# Patient Record
Sex: FEMALE | Race: WHITE
[De-identification: names, ages, dates, MRNs, and addresses within clinical notes are randomized per-mention and may not be internally consistent; named-entity substitution may affect disease eponyms.]

---

## 2017-04-27 ENCOUNTER — HOSPITAL ENCOUNTER (OUTPATIENT)
Dept: HOSPITAL 31 - C.ENDO | Age: 67
Discharge: HOME | End: 2017-04-27
Attending: INTERNAL MEDICINE
Payer: MEDICARE

## 2017-04-27 VITALS — RESPIRATION RATE: 14 BRPM

## 2017-04-27 VITALS — DIASTOLIC BLOOD PRESSURE: 54 MMHG | SYSTOLIC BLOOD PRESSURE: 124 MMHG | HEART RATE: 59 BPM

## 2017-04-27 VITALS — BODY MASS INDEX: 32.8 KG/M2

## 2017-04-27 VITALS — OXYGEN SATURATION: 100 %

## 2017-04-27 VITALS — TEMPERATURE: 98.2 F

## 2017-04-27 DIAGNOSIS — K29.50: ICD-10-CM

## 2017-04-27 DIAGNOSIS — B96.81: ICD-10-CM

## 2017-04-27 DIAGNOSIS — Z12.11: ICD-10-CM

## 2017-04-27 DIAGNOSIS — D12.2: ICD-10-CM

## 2017-04-27 DIAGNOSIS — K21.0: Primary | ICD-10-CM

## 2017-04-27 DIAGNOSIS — K64.1: ICD-10-CM

## 2017-04-27 PROCEDURE — 88312 SPECIAL STAINS GROUP 1: CPT

## 2017-04-27 PROCEDURE — 45380 COLONOSCOPY AND BIOPSY: CPT

## 2017-04-27 PROCEDURE — 88305 TISSUE EXAM BY PATHOLOGIST: CPT

## 2017-04-27 PROCEDURE — 88342 IMHCHEM/IMCYTCHM 1ST ANTB: CPT

## 2017-04-27 PROCEDURE — 43239 EGD BIOPSY SINGLE/MULTIPLE: CPT

## 2017-12-23 ENCOUNTER — HOSPITAL ENCOUNTER (OUTPATIENT)
Dept: HOSPITAL 14 - H.ER | Age: 67
Setting detail: OBSERVATION
LOS: 1 days | Discharge: HOME | End: 2017-12-24
Payer: MEDICARE

## 2017-12-23 VITALS — BODY MASS INDEX: 32.8 KG/M2

## 2017-12-23 DIAGNOSIS — M81.0: ICD-10-CM

## 2017-12-23 DIAGNOSIS — E78.5: ICD-10-CM

## 2017-12-23 DIAGNOSIS — R07.89: Primary | ICD-10-CM

## 2017-12-23 DIAGNOSIS — I10: ICD-10-CM

## 2017-12-23 DIAGNOSIS — J45.909: ICD-10-CM

## 2017-12-23 DIAGNOSIS — Z79.899: ICD-10-CM

## 2017-12-23 LAB
APTT BLD: 33.2 SECONDS (ref 25.6–37.1)
BASOPHILS # BLD AUTO: 0.1 K/UL (ref 0–0.2)
BASOPHILS NFR BLD: 0.5 % (ref 0–2)
BUN SERPL-MCNC: 21 MG/DL (ref 7–17)
CALCIUM SERPL-MCNC: 9.5 MG/DL (ref 8.4–10.2)
CHLORIDE SERPL-SCNC: 105 MMOL/L (ref 98–107)
CO2 SERPL-SCNC: 29 MMOL/L (ref 22–30)
EOSINOPHIL # BLD AUTO: 0.4 K/UL (ref 0–0.7)
EOSINOPHIL NFR BLD: 3.9 % (ref 0–4)
ERYTHROCYTE [DISTWIDTH] IN BLOOD BY AUTOMATED COUNT: 14.1 % (ref 11.5–14.5)
GLUCOSE SERPL-MCNC: 102 MG/DL (ref 65–105)
HCT VFR BLD CALC: 36.1 % (ref 34–47)
LYMPHOCYTES # BLD AUTO: 2.3 K/UL (ref 1–4.3)
LYMPHOCYTES NFR BLD AUTO: 20 % (ref 20–40)
MCH RBC QN AUTO: 28.6 PG (ref 27–31)
MCHC RBC AUTO-ENTMCNC: 33 G/DL (ref 33–37)
MCV RBC AUTO: 86.5 FL (ref 81–99)
MONOCYTES # BLD: 1 K/UL (ref 0–0.8)
MONOCYTES NFR BLD: 9 % (ref 0–10)
NEUTROPHILS # BLD: 7.7 K/UL (ref 1.8–7)
NEUTROPHILS NFR BLD AUTO: 66.6 % (ref 50–75)
NRBC BLD AUTO-RTO: 0.1 % (ref 0–0)
PLATELET # BLD: 253 K/UL (ref 130–400)
PMV BLD AUTO: 8.5 FL (ref 7.2–11.7)
POTASSIUM SERPL-SCNC: 3.5 MMOL/L (ref 3.6–5)
SODIUM SERPL-SCNC: 141 MMOL/L (ref 132–148)
WBC # BLD AUTO: 11.6 K/UL (ref 4.8–10.8)

## 2017-12-23 PROCEDURE — 93005 ELECTROCARDIOGRAM TRACING: CPT

## 2017-12-23 PROCEDURE — 85027 COMPLETE CBC AUTOMATED: CPT

## 2017-12-23 PROCEDURE — 36415 COLL VENOUS BLD VENIPUNCTURE: CPT

## 2017-12-23 PROCEDURE — 80053 COMPREHEN METABOLIC PANEL: CPT

## 2017-12-23 PROCEDURE — 80048 BASIC METABOLIC PNL TOTAL CA: CPT

## 2017-12-23 PROCEDURE — 84443 ASSAY THYROID STIM HORMONE: CPT

## 2017-12-23 PROCEDURE — 85730 THROMBOPLASTIN TIME PARTIAL: CPT

## 2017-12-23 PROCEDURE — 84436 ASSAY OF TOTAL THYROXINE: CPT

## 2017-12-23 PROCEDURE — 85610 PROTHROMBIN TIME: CPT

## 2017-12-23 PROCEDURE — 99285 EMERGENCY DEPT VISIT HI MDM: CPT

## 2017-12-23 PROCEDURE — 71010: CPT

## 2017-12-23 PROCEDURE — 85025 COMPLETE CBC W/AUTO DIFF WBC: CPT

## 2017-12-23 PROCEDURE — 84484 ASSAY OF TROPONIN QUANT: CPT

## 2017-12-23 NOTE — ED PDOC
HPI: Chest Pain


Chief Complaint (Nursing): Chest Pain


Chief Complaint (Provider): Chest Pain


History Per: Patient


History/Exam Limitations: language barrier ( [id number])


Onset/Duration Of Symptoms: Days (2)


Current Symptoms Are (Timing): Still Present


Severity: Moderate


Pain Scale Rating Of: 7


Front/Back of Body, Lg (Color): 


  __________________________














  __________________________





 1 - Chest pain





Quality: Sharp


Exacerbating Factors: Deep Breathing


Alleviating Factors: Rest


Additional History Per: Family


Additional Complaint(s): 





Daria Jiménez is a pleasant 68 yo lady with PMHx of anxiety, dyslipidemia, HTN, 

asthma presents with a 2 day history of chest pain. Pain is R annelise-sternal. 

Consistent for the last 2 days. Sharp in nature. Localized. Associated with 

SOB. Rated 7/10. No history of MI or stroke. Aggravated with movement and 

alleviated slightly with rest  Daughter was present.


She shares that today, she was cutting a cucumber and accidentally punctured 

her middle finger. There was slight bleeding. However, she still experiences 

pain with some limitation in extension and flexion.





PCP: Dr. Faheem Villela


FamHx: No hx of CAD or DM


SurgHx: none


Soc: Denies smoking, alcohol, illicit drugs


NKDA


 





- Risk Factors


TAD Risk Factors: Pos: Hypertension





Past Medical History


Vital Signs: 


 Last Vital Signs











Temp      


 


Pulse  80   12/23/17 19:25


 


Resp  20   12/23/17 19:25


 


BP  141/73   12/23/17 19:25


 


Pulse Ox  99   12/23/17 20:44














- Medical History


PMH: Anxiety, Arthritis, Asthma, HTN, Hyperlipidemia, Osteoporosis


   Denies: Chronic Kidney Disease





- Family History


Family History: States: No Known Family Hx





- Living Arrangements


Living Arrangements: With Family





- Immunization History


Hx Tetanus Toxoid Vaccination: No


Hx Influenza Vaccination: No


Hx Pneumococcal Vaccination: No





- Home Medications


Home Medications: 


 Ambulatory Orders











 Medication  Instructions  Recorded


 


Amlodipine Besylate 2.5 mg PO DAILY 09/25/15


 


Triamterene/Hydrochlorothiazid 1 cap PO DAILY 09/25/15





[Triamterene-Hydrochlorothiazide  





25 mg-37.5 mg]  


 


Albuterol HFA [Ventolin HFA 90 1 puff IH BID PRN 04/27/17





mcg/actuation (8 g)]  


 


Alendronate Sodium [Binosto] 70 mg PO QWK 04/27/17


 


Budesonide/Formoterol Fumarate 1 aer IH BID 04/27/17





[Symbicort]  


 


Calcium Carbonate [Caltrate 600] 600 mg PO DAILY 04/27/17


 


Diclofenac Sodium [Voltaren] 100 gm TP BID PRN 04/27/17


 


Lactulose 10 gm PO DAILY PRN 04/27/17


 


Multivit,Iron,Min 5/Folic Acid 1 tab PO DAILY 04/27/17





[Strovite Forte]  


 


Naproxen [Naprosyn] 500 mg PO DAILY PRN 04/27/17


 


Omega-3 Fatty Acids/Fish Oil [Fish 1,000 mg PO BID 04/27/17





Oil 1,000 mg Capsule]  


 


Omeprazole 40 mg PO DAILY 04/27/17














- Allergies


Allergies/Adverse Reactions: 


 Allergies











Allergy/AdvReac Type Severity Reaction Status Date / Time


 


No Known Allergies Allergy   Verified 09/25/15 07:39














DANIEL Risk Score for UA/NSTEMI





- DANIEL Risk Score


Age > 64: YES


3 or more CAD Risk Factors: NO


Known CAD (Stenosis greater than 50%): NO


Aspirin use in past 7 days: NO


Severe Angina: YES


EKG ST changes greater than 0.5mm: NO


Positive Cardiac Marker: NO


DANIEL Score: 2


Risk %: 8%





Curb-65 Severity Score





- CURB-65 Severity Score


Respiratory Rate greater than/equal to 30: No


Systolic BP <90 or Diastolic BP less than/equal 60mmHg: No


Age >64: Yes


Curb-65 Score: 1


Percentage 30-day mortality: 2.7%





Review of Systems


Cardiovascular: Positive for: Chest Pain (R sternal)


Respiratory: Positive for: Shortness of Breath


Neurological: Negative for: Altered Mental Status


Psych: Positive for: Anxiety





Physical Exam





- Reviewed


Vital Signs Reviewed: Yes





- Physical Exam


Appears: Positive for: Uncomfortable


Skin: Positive for: Warm, Dry


Eye Exam: Positive for: Normal appearance, EOMI.  Negative for: Nystagmus


Cardiovascular/Chest: Positive for: Regular Rate, Rhythm, Other (R sternal 

tenderness upon palpation)


Respiratory: Positive for: Normal Breath Sounds


Gastrointestinal/Abdominal: Positive for: Normal Exam, Bowel Sounds, Soft


Extremity: Positive for: Capillary Refill (<2 sec), Swelling (L middle digit), 

Other (L middle digit with healing puncture wound. No surrounding erythema. 

limited ROM 2/2 pain. No ulcerations or purulent discharge.)


Neurologic/Psych: Positive for: Alert, Oriented





- Laboratory Results


Result Diagrams: 


 12/23/17 19:57





 12/23/17 19:57





- ECG


O2 Sat by Pulse Oximetry: 99





- Progress


ED Course And Treament: 





Troponin negative x1. Observation overnight for serial troponin. 





Disposition





- Clinical Impression


Clinical Impression: 


 Chest pain








- Patient ED Disposition


Is Patient to be Admitted: Yes





- Disposition


Disposition Time: 20:45


Condition: FAIR


Forms:  Location (English)

## 2017-12-24 VITALS
HEART RATE: 65 BPM | OXYGEN SATURATION: 96 % | TEMPERATURE: 98.2 F | DIASTOLIC BLOOD PRESSURE: 68 MMHG | SYSTOLIC BLOOD PRESSURE: 119 MMHG

## 2017-12-24 VITALS — RESPIRATION RATE: 18 BRPM

## 2017-12-24 LAB
ALBUMIN/GLOB SERPL: 1.1 {RATIO} (ref 1–2.1)
ALP SERPL-CCNC: 83 U/L (ref 38–126)
ALT SERPL-CCNC: 25 U/L (ref 9–52)
APTT BLD: 33 SECONDS (ref 25.6–37.1)
AST SERPL-CCNC: 23 U/L (ref 14–36)
BILIRUB SERPL-MCNC: 0.4 MG/DL (ref 0.2–1.3)
BUN SERPL-MCNC: 15 MG/DL (ref 7–17)
CALCIUM SERPL-MCNC: 9.1 MG/DL (ref 8.4–10.2)
CHLORIDE SERPL-SCNC: 107 MMOL/L (ref 98–107)
CO2 SERPL-SCNC: 27 MMOL/L (ref 22–30)
ERYTHROCYTE [DISTWIDTH] IN BLOOD BY AUTOMATED COUNT: 14 % (ref 11.5–14.5)
GLOBULIN SER-MCNC: 3.1 GM/DL (ref 2.2–3.9)
GLUCOSE SERPL-MCNC: 76 MG/DL (ref 65–105)
HCT VFR BLD CALC: 34.5 % (ref 34–47)
MCH RBC QN AUTO: 28.2 PG (ref 27–31)
MCHC RBC AUTO-ENTMCNC: 32.4 G/DL (ref 33–37)
MCV RBC AUTO: 87.3 FL (ref 81–99)
PLATELET # BLD: 221 K/UL (ref 130–400)
POTASSIUM SERPL-SCNC: 3.7 MMOL/L (ref 3.6–5)
PROT SERPL-MCNC: 6.5 G/DL (ref 6.3–8.2)
SODIUM SERPL-SCNC: 142 MMOL/L (ref 132–148)
T4 SERPL-MCNC: 6.9 UG/DL (ref 5.5–11)
TSH SERPL-ACNC: 2.08 MIU/ML (ref 0.46–4.68)
WBC # BLD AUTO: 8.4 K/UL (ref 4.8–10.8)

## 2017-12-24 NOTE — RAD
PROCEDURE:  CHEST RADIOGRAPH, 1 VIEW



HISTORY:

chest pain



COMPARISON:

None available.



FINDINGS:



LUNGS:

Clear.



PLEURA:

No pneumothorax or pleural fluid seen.



CARDIOVASCULAR:

Normal.



OSSEOUS STRUCTURES:

Degenerative changes.



VISUALIZED UPPER ABDOMEN:

Normal.



OTHER FINDINGS:

None. 



IMPRESSION:

No active disease.

## 2017-12-24 NOTE — CARD
--------------- APPROVED REPORT --------------





EKG Measurement

Heart [id number]

[id number]

[id number]

[id number]

[id number]



<Conclusion>

Normal sinus rhythm

Moderate voltage criteria for LVH, may be normal variant

Inferior infarct, age undetermined

Abnormal ECG

## 2017-12-24 NOTE — CP.PCM.DIS
Provider





- Provider


Date of Admission: 


12/23/17 21:41





Attending physician: 


Nancy Freed MD





Time Spent in preparation of Discharge (in minutes): 30





Diagnosis





- Discharge Diagnosis


(1) Chest pain


Status: Acute   





Hospital Course





- Lab Results


Lab Results: 


 Most Recent Lab Values











WBC  11.6 K/uL (4.8-10.8)  H  12/23/17  19:57    


 


RBC  4.17 Mil/uL (3.80-5.20)   12/23/17  19:57    


 


Hgb  11.9 g/dL (12.0-16.0)  L  12/23/17  19:57    


 


Hct  36.1 % (34.0-47.0)   12/23/17  19:57    


 


MCV  86.5 fl (81.0-99.0)   12/23/17  19:57    


 


MCH  28.6 pg (27.0-31.0)   12/23/17  19:57    


 


MCHC  33.0 g/dL (33.0-37.0)   12/23/17  19:57    


 


RDW  14.1 % (11.5-14.5)   12/23/17  19:57    


 


Plt Count  253 K/uL (130-400)   12/23/17  19:57    


 


MPV  8.5 fl (7.2-11.7)   12/23/17  19:57    


 


Neut % (Auto)  66.6 % (50.0-75.0)   12/23/17  19:57    


 


Lymph % (Auto)  20.0 % (20.0-40.0)   12/23/17  19:57    


 


Mono % (Auto)  9.0 % (0.0-10.0)   12/23/17  19:57    


 


Eos % (Auto)  3.9 % (0.0-4.0)   12/23/17  19:57    


 


Baso % (Auto)  0.5 % (0.0-2.0)   12/23/17  19:57    


 


Neut #  7.7 K/uL (1.8-7.0)  H  12/23/17  19:57    


 


Lymph #  2.3 K/uL (1.0-4.3)   12/23/17  19:57    


 


Mono #  1.0 K/uL (0.0-0.8)  H  12/23/17  19:57    


 


Eos #  0.4 K/uL (0.0-0.7)   12/23/17  19:57    


 


Baso #  0.1 K/uL (0.0-0.2)   12/23/17  19:57    


 


PT  10.4 Seconds (9.8-13.1)   12/23/17  19:57    


 


INR  0.9  (0.9-1.2)   12/23/17  19:57    


 


APTT  33.2 Seconds (25.6-37.1)   12/23/17  19:57    


 


Sodium  141 mmol/l (132-148)   12/23/17  19:57    


 


Potassium  3.5 MMOL/L (3.6-5.0)  L  12/23/17  19:57    


 


Chloride  105 mmol/L ()   12/23/17  19:57    


 


Carbon Dioxide  29 mmol/L (22-30)   12/23/17  19:57    


 


Anion Gap  11  (10-20)   12/23/17  19:57    


 


BUN  21 mg/dl (7-17)  H  12/23/17  19:57    


 


Creatinine  0.7 mg/dl (0.7-1.2)   12/23/17  19:57    


 


Est GFR ( Amer)  > 60   12/23/17  19:57    


 


Est GFR (Non-Af Amer)  > 60   12/23/17  19:57    


 


Random Glucose  102 mg/dL ()   12/23/17  19:57    


 


Calcium  9.5 mg/dL (8.4-10.2)   12/23/17  19:57    


 


Troponin I  < 0.0120 ng/mL (0.00-0.120)   12/24/17  06:16    














- Hospital Course


Hospital Course: 





67 YEAR OLD FEMALE WITH PMH HTN WAS ADMITTED FOR CHEST PAIN. CARDIAC ENZYMES 

WERE TRENDED, NEGATIVE X2.  EKG NO ACUTE ISCHEMIA OR INFARCT. CARDIOLOGY WAS 

CONSULTED THIS MORNING. THIS MORNING PATIENT STATED SHE NO LONGER WISHES TO 

STAY. RISKS OF LEAVING AMA, AND BENEFITS OF STAYING EXPLAINED. PATIENT DECIDED 

TO AMA. ABX GIVEN FOR FINGER.





Discharge Exam





- Head Exam


Additional comments: 





DECLINED EXAM





Discharge Plan





- Follow Up Plan


Condition: FAIR


Disposition: AGAINST MEDICAL ADVICE


Additional Instructions: 


return to ER if condition returns or worsens.


follow up with PCP and CARDIOLOGY ASAP

## 2018-07-04 ENCOUNTER — HOSPITAL ENCOUNTER (EMERGENCY)
Dept: HOSPITAL 31 - C.ER | Age: 68
Discharge: HOME | End: 2018-07-04
Payer: MEDICARE

## 2018-07-04 VITALS
HEART RATE: 68 BPM | DIASTOLIC BLOOD PRESSURE: 69 MMHG | RESPIRATION RATE: 16 BRPM | SYSTOLIC BLOOD PRESSURE: 138 MMHG | TEMPERATURE: 98.1 F

## 2018-07-04 VITALS — BODY MASS INDEX: 32.8 KG/M2

## 2018-07-04 VITALS — OXYGEN SATURATION: 100 %

## 2018-07-04 DIAGNOSIS — I10: ICD-10-CM

## 2018-07-04 DIAGNOSIS — E78.00: ICD-10-CM

## 2018-07-04 DIAGNOSIS — R42: Primary | ICD-10-CM

## 2018-07-04 DIAGNOSIS — M81.0: ICD-10-CM

## 2018-07-04 DIAGNOSIS — J45.909: ICD-10-CM

## 2018-07-04 LAB
ALBUMIN SERPL-MCNC: 4.5 G/DL (ref 3.5–5)
ALBUMIN/GLOB SERPL: 1.2 {RATIO} (ref 1–2.1)
ALT SERPL-CCNC: 34 U/L (ref 9–52)
APTT BLD: 33 SECONDS (ref 21–34)
AST SERPL-CCNC: 36 U/L (ref 14–36)
BASOPHILS # BLD AUTO: 0.1 K/UL (ref 0–0.2)
BASOPHILS NFR BLD: 0.8 % (ref 0–2)
BILIRUB UR-MCNC: NEGATIVE MG/DL
BUN SERPL-MCNC: 22 MG/DL (ref 7–17)
CALCIUM SERPL-MCNC: 9.7 MG/DL (ref 8.6–10.4)
EOSINOPHIL # BLD AUTO: 0.4 K/UL (ref 0–0.7)
EOSINOPHIL NFR BLD: 4.7 % (ref 0–4)
ERYTHROCYTE [DISTWIDTH] IN BLOOD BY AUTOMATED COUNT: 14.1 % (ref 11.5–14.5)
GFR NON-AFRICAN AMERICAN: > 60
GLUCOSE UR STRIP-MCNC: NORMAL MG/DL
HGB BLD-MCNC: 13 G/DL (ref 11–16)
INR PPP: 0.9
LEUKOCYTE ESTERASE UR-ACNC: (no result) LEU/UL
LYMPHOCYTES # BLD AUTO: 2 K/UL (ref 1–4.3)
LYMPHOCYTES NFR BLD AUTO: 22.2 % (ref 20–40)
MCH RBC QN AUTO: 28.8 PG (ref 27–31)
MCHC RBC AUTO-ENTMCNC: 33.7 G/DL (ref 33–37)
MCV RBC AUTO: 85.5 FL (ref 81–99)
MONOCYTES # BLD: 0.8 K/UL (ref 0–0.8)
MONOCYTES NFR BLD: 9.2 % (ref 0–10)
NEUTROPHILS # BLD: 5.7 K/UL (ref 1.8–7)
NEUTROPHILS NFR BLD AUTO: 63.1 % (ref 50–75)
NRBC BLD AUTO-RTO: 0 % (ref 0–2)
PH UR STRIP: 5 [PH] (ref 5–8)
PLATELET # BLD: 264 K/UL (ref 130–400)
PMV BLD AUTO: 8.2 FL (ref 7.2–11.7)
PROT UR STRIP-MCNC: NEGATIVE MG/DL
PROTHROMBIN TIME: 10.3 SECONDS (ref 9.7–12.2)
RBC # BLD AUTO: 4.5 MIL/UL (ref 3.8–5.2)
RBC # UR STRIP: NEGATIVE /UL
SP GR UR STRIP: 1 (ref 1–1.03)
UROBILINOGEN UR-MCNC: NORMAL MG/DL (ref 0.2–1)
WBC # BLD AUTO: 9 K/UL (ref 4.8–10.8)

## 2018-07-04 PROCEDURE — 70450 CT HEAD/BRAIN W/O DYE: CPT

## 2018-07-04 PROCEDURE — 82948 REAGENT STRIP/BLOOD GLUCOSE: CPT

## 2018-07-04 PROCEDURE — 82550 ASSAY OF CK (CPK): CPT

## 2018-07-04 PROCEDURE — 85730 THROMBOPLASTIN TIME PARTIAL: CPT

## 2018-07-04 PROCEDURE — 96360 HYDRATION IV INFUSION INIT: CPT

## 2018-07-04 PROCEDURE — 84484 ASSAY OF TROPONIN QUANT: CPT

## 2018-07-04 PROCEDURE — 80053 COMPREHEN METABOLIC PANEL: CPT

## 2018-07-04 PROCEDURE — 81001 URINALYSIS AUTO W/SCOPE: CPT

## 2018-07-04 PROCEDURE — 99285 EMERGENCY DEPT VISIT HI MDM: CPT

## 2018-07-04 PROCEDURE — 71045 X-RAY EXAM CHEST 1 VIEW: CPT

## 2018-07-04 PROCEDURE — 85025 COMPLETE CBC W/AUTO DIFF WBC: CPT

## 2018-07-04 PROCEDURE — 85610 PROTHROMBIN TIME: CPT

## 2018-07-04 NOTE — RAD
PROCEDURE:  CHEST RADIOGRAPH, 1 VIEW



HISTORY:

AMS



COMPARISON:

Chest radiograph dated 06/05/2017.



FINDINGS:



LUNGS:

Clear.



PLEURA:

No pneumothorax or pleural fluid seen.



CARDIOVASCULAR:

Atherosclerotic aortic calcifications.  Cardiomediastinal silhouette 

stably enlarged.



OSSEOUS STRUCTURES:

Unchanged.



VISUALIZED UPPER ABDOMEN:

Normal.



OTHER FINDINGS:

None. 



IMPRESSION:

No active disease.

## 2018-07-04 NOTE — C.PDOC
History Of Present Illness


69yo female with history of asthma, hypertension, vertigo, and osteoporosis, 

comes to ER from [facility] for evaluation of dizziness and fall prior 

to arrival. Patient states she got up from a chair, felt dizzy and fell down 

and hit her head on the table. Patient now is complaining of right sided 

headache. Otherwise, she denies severe headache, vision changes, focal deficits

, nausea, vomiting, neck pain, CP, SOB, dyspnea, diaphoresis, palpitation, 

abdominal pain, saddle anesthesia, incontinence, denies weakness, deformity to B

/L lEs. Pt admits, takes Meclizine for intermittent dizziness, had similar sx 

in past. take meclizine ' every other day, did not take today". AT present time

, pt appears comfortable, not in any apparent distress.


FYI: I spoke with nurse at [facility] who reports she witnessed the fall 

and states the patient did not have a syncopal episode or loss of consciousness

; she states the patient was awake during the entire incident and was of normal 

affect after the fall. As per nurse, pt was ambulatory after fall at [facility] 

without difficulty. The nurse reported patient usually walks with a cane but 

did not use it today. 


Time Seen by Provider: 07/04/18 10:08


Chief Complaint (Nursing): Dizziness/Lightheaded


History Per: Patient


History/Exam Limitations: no limitations


Onset/Duration Of Symptoms: Hrs


Activity At Onset Of Symptoms: Sitting


Seizure Or Post-ictal Symptoms: None


Possible Causative Factor(s): Vertigo


Fall Associated With With Symptoms: Yes, Positive Injury (hit head on side of 

table)


Additional History Per: Patient





Past Medical History


Reviewed: Historical Data, Nursing Documentation, Vital Signs


Vital Signs: 


 Last Vital Signs











Temp  98.1 F   07/04/18 14:13


 


Pulse  68   07/04/18 14:13


 


Resp  16   07/04/18 14:13


 


BP  138/69   07/04/18 14:13


 


Pulse Ox  100   07/04/18 14:13














- Medical History


PMH: Anxiety, Arthritis, Asthma, HTN, Hypercholesterolemia, Hyperlipidemia, 

Osteoporosis


   Denies: HIV, Chronic Kidney Disease


Surgical History: No Surg Hx





- [facility] Procedures








ENDOSC POLYPECTOMY OF LG INTEST (09/25/15)








Family History: States: No Known Family Hx





- Social History


Hx Tobacco Use: No


Hx Alcohol Use: No


Hx Substance Use: No





- Immunization History


Hx Tetanus Toxoid Vaccination: No


Hx Influenza Vaccination: No


Hx Pneumococcal Vaccination: No





Review Of Systems


Except As Marked, All Systems Reviewed And Found Negative.


Constitutional: Negative for: Fever, Chills


Eyes: Negative for: Vision Change


Cardiovascular: Negative for: Chest Pain


Respiratory: Negative for: Shortness of Breath


Gastrointestinal: Negative for: Vomiting, Abdominal Pain


Musculoskeletal: Positive for: Neck Pain.  Negative for: Arm Pain, Back Pain, 

Leg Pain


Neurological: Positive for: Headache, Dizziness.  Negative for: Weakness, 

Numbness, Incoordination, Seizures





Physical Exam





- Physical Exam


Appears: Well, Non-toxic, No Acute Distress


Skin: Warm, Dry, No Ecchymosis


Head: Normacephalic, Tenderness (right occipital scalp tenderness), No Swelling

, No Abrasion, No Laceration


Eye(s): bilateral: PERRL, EOMI


Ear(s): Bilateral: Normal


Nose: No Flaring, No Discharge


Oral Mucosa: Moist


Tongue: Normal Appearing


Lips: Normal Appearing


Throat: No Drooling


Neck: Normal ROM, Trachea Midline, No Midline Cervical Tenderness, No 

Paracervical Tenderness, No Step Off Deformity, Supple


Chest: Symmetrical, No Deformity, No Tenderness


Cardiovascular: Rhythm Regular, No Murmur, No JVD, Other ((-) carotid bruits B/L

)


Respiratory: No Decreased Breath Sounds, No Accessory Muscle Use, No Stridor, 

No Wheezing, No Plerual Rub


Gastrointestinal/Abdominal: Soft, No Tenderness, No Distention, No Guarding


Back: No Vertebral Tenderness, No Paraspinal Tenderness


Extremity: Normal ROM, No Tenderness, No Pedal Edema, No Deformity, No Swelling


Neurological/Psych: Oriented x3, Normal Speech, Normal Cognition, Normal Motor, 

Normal Sensation, Normal Reflexes





ED Course And Treatment





- Laboratory Results


Result Diagrams: 


 07/04/18 11:06





 07/04/18 11:06


Lab Interpretation: Normal


ECG: Interpreted By Me, Viewed By Me


Interpretation Of ECG: SR@63/min, LAD, T wave inversion in III, no acute ST-T 

changes.


O2 Sat by Pulse Oximetry: 100 (RA)


Pulse Ox Interpretation: Normal





- Radiology


CXR: Interpreted by Me, Read By Radiologist


CXR Interpretation: Yes: No Acute Disease





- CT Scan/US


  ** CT head w/o contrast


Other Rad Studies (CT/US): Radiology Report Reviewed


CT/US Interpretation: Creator : Narendra Thakur RT.  Dictator : Suhail Ramos MD.   :  Approver : Suhail Ramos MD.  Approver2 :  

Report Date : 07/04/2018 10:56:59.  My Comment :  ******************************

*****************************************************.  PROCEDURE:  CHEST 

RADIOGRAPH, 1 VIEW.  HISTORY:  AMS.  COMPARISON:  Chest radiograph dated 06/05/ 2017.  FINDINGS:  LUNGS:  Clear.  PLEURA:  No pneumothorax or pleural fluid 

seen.  CARDIOVASCULAR:  Atherosclerotic aortic calcifications.  

Cardiomediastinal silhouette stably enlarged.  OSSEOUS STRUCTURES:  Unchanged.  

VISUALIZED UPPER ABDOMEN:  Normal.  OTHER FINDINGS:  None.  IMPRESSION:  No 

active disease.


Progress Note: Labs, CXR, EKG, Urinalysis, and CT Head w/o contrast ordered.  

Patient given IV Fluids.  Pt was OBS in ED for 2.5 hours and reports moderate 

improvement in sx.  On re-eval, pt is afebrile, hemodynamicaly stable.  Non-

toxic. Pt is ambulatoyr in ED w/baseline gait. ENT: no acute findings.  neck: 

SUpple, (-) JVD.  Lungs: CTA B/L, BS equal B/L.  Abd: benign, (-) guarding, (-) 

rebound. Neurologicaly intact.  Blood work review and appears normal. EKG, CXR- 

no acute findings.  CT head: no acute abnormalities noted.  Orthostatic review- 

normal.  case discussed with  and admission offered to tele.  Results 

review and discussed with patient and family. Pt refused admission and wish to 

go home now with outpt  F/U.  Pt clinical findings c/w dizziness, nos. Unstable 

gait, use of cane. Pt and family advised.  ref. to F/u with PMD in 1 day for re-

eavl.  return if any worsening or new changes.  case dsicussed with  

who agrees with dispo and patient discharge.





Disposition


Counseled Patient/Family Regarding: Studies Performed, Diagnosis, Need For 

Followup





- Disposition


Referrals: 


Mary Lezama MD [Staff Provider] - 


Disposition: HOME/ ROUTINE


Disposition Time: 12:13


Condition: STABLE


Additional Instructions: 


Encourage fluids


Bedrest for 1-2 days


Follow up with PMD in 1-2 days for re-evaluation.


return to ED if any worsening or new changes.


Instructions:  Dizziness, Nonvertigo, (DC)


Forms:  [facility] (English)





- Clinical Impression


Clinical Impression: 


 Dizziness








- PA / NP / Resident Statement


MD/DO has reviewed & agrees with the documentation as recorded.





- Scribe Statement


The provider has reviewed the documentation as recorded by the Scribe ([place] [address])


Provider Attestation:


All medical record entries made by the Scribe were at my direction and 

personally dictated by me. I have reviewed the chart and agree that the record 

accurately reflects my personal performance of the history, physical exam, 

medical decision making, and the department course for this patient. I have 

also personally directed, reviewed, and agree with the discharge instructions 

and disposition.

## 2018-07-04 NOTE — CT
PROCEDURE:  CT HEAD WITHOUT CONTRAST.



HISTORY:

AMS



COMPARISON:

CT head dated 07/02/2015. 



TECHNIQUE:

Axial computed tomography images were obtained through the head/brain 

without intravenous contrast.  



Radiation dose:



Total exam DLP = 800.8 mGy-cm.



This CT exam was performed using one or more of the following dose 

reduction techniques: Automated exposure control, adjustment of the 

mA and/or kV according to patient size, and/or use of iterative 

reconstruction technique.



FINDINGS:



HEMORRHAGE:

No intracranial hemorrhage. 



BRAIN:

No mass effect or edema.  Mild atrophy.  Mild chronic microvascular 

ischemic changes.



VENTRICLES:

Mildly prominent. No hydrocephalus. 



CALVARIUM:

Unremarkable.



PARANASAL SINUSES:

Mild ethmoid sinus mucosal thickening.



MASTOID AIR CELLS:

Unremarkable as visualized. No inflammatory changes.



OTHER FINDINGS:

None.



IMPRESSION:

No acute intracranial pathology.  Age-related changes.

## 2018-09-05 ENCOUNTER — HOSPITAL ENCOUNTER (EMERGENCY)
Dept: HOSPITAL 14 - H.ER | Age: 68
Discharge: HOME | End: 2018-09-05
Payer: MEDICARE

## 2018-09-05 VITALS — OXYGEN SATURATION: 98 % | RESPIRATION RATE: 18 BRPM

## 2018-09-05 VITALS — DIASTOLIC BLOOD PRESSURE: 78 MMHG | TEMPERATURE: 97.6 F | SYSTOLIC BLOOD PRESSURE: 132 MMHG | HEART RATE: 70 BPM

## 2018-09-05 VITALS — BODY MASS INDEX: 42.2 KG/M2

## 2018-09-05 DIAGNOSIS — R42: Primary | ICD-10-CM

## 2018-09-05 DIAGNOSIS — F41.9: ICD-10-CM

## 2018-09-05 DIAGNOSIS — I10: ICD-10-CM

## 2018-09-05 DIAGNOSIS — M81.0: ICD-10-CM

## 2018-09-05 DIAGNOSIS — E78.00: ICD-10-CM

## 2018-09-05 LAB
ALBUMIN SERPL-MCNC: 3.9 G/DL (ref 3.5–5)
ALBUMIN/GLOB SERPL: 1.1 {RATIO} (ref 1–2.1)
ALT SERPL-CCNC: 20 U/L (ref 9–52)
AST SERPL-CCNC: 27 U/L (ref 14–36)
BASOPHILS # BLD AUTO: 0.1 K/UL (ref 0–0.2)
BASOPHILS NFR BLD: 0.9 % (ref 0–2)
BUN SERPL-MCNC: 27 MG/DL (ref 7–17)
CALCIUM SERPL-MCNC: 8.6 MG/DL (ref 8.4–10.2)
EOSINOPHIL # BLD AUTO: 0.4 K/UL (ref 0–0.7)
EOSINOPHIL NFR BLD: 4.7 % (ref 0–4)
ERYTHROCYTE [DISTWIDTH] IN BLOOD BY AUTOMATED COUNT: 14.3 % (ref 11.5–14.5)
GFR NON-AFRICAN AMERICAN: > 60
HGB BLD-MCNC: 12.1 G/DL (ref 12–16)
LYMPHOCYTES # BLD AUTO: 1.6 K/UL (ref 1–4.3)
LYMPHOCYTES NFR BLD AUTO: 19.4 % (ref 20–40)
MCH RBC QN AUTO: 29 PG (ref 27–31)
MCHC RBC AUTO-ENTMCNC: 33.7 G/DL (ref 33–37)
MCV RBC AUTO: 86 FL (ref 81–99)
MONOCYTES # BLD: 0.8 K/UL (ref 0–0.8)
MONOCYTES NFR BLD: 9.5 % (ref 0–10)
NEUTROPHILS # BLD: 5.2 K/UL (ref 1.8–7)
NEUTROPHILS NFR BLD AUTO: 65.5 % (ref 50–75)
NRBC BLD AUTO-RTO: 0.5 % (ref 0–0)
PLATELET # BLD: 256 K/UL (ref 130–400)
PMV BLD AUTO: 8.7 FL (ref 7.2–11.7)
RBC # BLD AUTO: 4.15 MIL/UL (ref 3.8–5.2)
WBC # BLD AUTO: 8 K/UL (ref 4.8–10.8)

## 2018-09-05 PROCEDURE — 85025 COMPLETE CBC W/AUTO DIFF WBC: CPT

## 2018-09-05 PROCEDURE — 99283 EMERGENCY DEPT VISIT LOW MDM: CPT

## 2018-09-05 PROCEDURE — 80053 COMPREHEN METABOLIC PANEL: CPT

## 2018-09-05 PROCEDURE — 70553 MRI BRAIN STEM W/O & W/DYE: CPT

## 2018-09-05 PROCEDURE — 82948 REAGENT STRIP/BLOOD GLUCOSE: CPT

## 2018-09-05 NOTE — ED PDOC
HPI: General Adult


Time Seen by Provider: 09/05/18 08:00


Chief Complaint (Nursing): Dizziness/Lightheaded


Chief Complaint (Provider): Dizziness


History Per: Patient,  ([id number])


History/Exam Limitations: no limitations


Onset/Duration Of Symptoms: Persistent


Have you had recent travel within the past 21 days to any of the following 

countries: [place], [place], [place] or [place]?: No


Current Symptoms Are (Timing): Still Present


Additional History Per: Patient


Additional Complaint(s): 


67yo female, with history of hypertension, high cholesterol, asthma, comes to 

ER for evaluation of dizziness, present persistently for > 1 year. Patient 

states she was seen by her PMD Dr. Cheng as well as an ENT and was informed to 

come to the ER for further evaluation. Patient currently complains of a mild 

headache and states the dizziness worsens when she stands up from a sitting 

position. She denies any weakness, numbness, photophobia, or head injury. She 

also denies any fever, chills, nausea, vomiting, diarrhea, chest pain, 

shortness of breath or abdominal pain. Patient offers no other complaints.





PMD: Dr. Cheng





Past Medical History


Reviewed: Historical Data, Nursing Documentation, Vital Signs


Vital Signs: 


 Last Vital Signs











Temp  97.6 F   09/05/18 12:54


 


Pulse  70   09/05/18 12:54


 


Resp  18   09/05/18 12:54


 


BP  132/78   09/05/18 12:54


 


Pulse Ox  98   09/05/18 14:16














- Medical History


PMH: Anxiety, Arthritis, Asthma, HTN, Hypercholesterolemia, Hyperlipidemia, 

Osteoporosis


   Denies: HIV, Chronic Kidney Disease





- Surgical History


Surgical History: No Surg Hx





- Family History


Family History: States: No Known Family Hx





- Social History


Current smoker - smoking cessation education provided: No


Alcohol: None


Drugs: Denies





- Immunization History


Hx Tetanus Toxoid Vaccination: No


Hx Influenza Vaccination: No


Hx Pneumococcal Vaccination: No





- Home Medications


Home Medications: 


 Ambulatory Orders











 Medication  Instructions  Recorded


 


Albuterol HFA [Ventolin HFA 90 1 puff IH DAILY 07/04/18





mcg/actuation (8 g)]  


 


Alendronate [Fosamax] 70 mg PO QWK 07/04/18


 


Ammonium Lactate 12% [Lac-Hydrin 1 appl TP DAILY 07/04/18





12% Cream (140 g)]  


 


Atorvastatin [Lipitor] 10 mg PO DAILY 07/04/18


 


Betamethasone Dip 0.05% [Diprolene] 1 ml TP DAILY 07/04/18


 


Escitalopram [Lexapro] 5 mg PO DAILY 07/04/18


 


Icosapent Ethyl [Vascepa] 1 gm PO DAILY 07/04/18


 


Levocetirizine Dihydrochloride 5 mg PO DAILY 07/04/18





[Levocetirizine Dihydrochloride]  


 


Meclizine HCl 12.5 mg PO DAILY 07/04/18


 


Metoprolol Succinate 25 mg PO DAILY 07/04/18


 


Olopatadine HCl 2.5 ml OP DAILY 07/04/18


 


Ropinirole HCl 0.25 mg PO DAILY 07/04/18


 


amLODIPine [Norvasc] 2.5 mg PO DAILY 07/04/18


 


Meclizine [Antivert] 12.5 mg PO Q6H PRN #6 tab 09/05/18














- Allergies


Allergies/Adverse Reactions: 


 Allergies











Allergy/AdvReac Type Severity Reaction Status Date / Time


 


No Known Allergies Allergy   Verified 09/05/18 08:09














Review of Systems


ROS Statement: Except As Marked, All Systems Reviewed And Found Negative


Constitutional: Negative for: Fever, Chills


Eyes: Negative for: Vision Change


Cardiovascular: Negative for: Chest Pain


Respiratory: Negative for: Shortness of Breath


Gastrointestinal: Negative for: Nausea, Vomiting, Abdominal Pain


Neurological: Positive for: Headache, Dizziness.  Negative for: Weakness, 

Numbness





Physical Exam





- Reviewed


Nursing Documentation Reviewed: Yes


Vital Signs Reviewed: Yes





- Physical Exam


Appears: Positive for: Non-toxic, No Acute Distress


Head Exam: Positive for: ATRAUMATIC, NORMAL INSPECTION, NORMOCEPHALIC


Skin: Positive for: Normal Color, Warm, DRY


Eye Exam: Positive for: Normal appearance, EOMI, PERRL.  Negative for: Nystagmus


ENT: Positive for: Normal ENT Inspection


Neck: Positive for: Normal, Painless ROM, Supple


Cardiovascular/Chest: Positive for: Regular Rate, Rhythm


Respiratory: Positive for: CNT, Normal Breath Sounds


Gastrointestinal/Abdominal: Positive for: Normal Exam, Soft


Back: Positive for: Normal Inspection


Extremity: Positive for: Normal ROM.  Negative for: Pedal Edema


Neurologic/Psych: Positive for: Alert, CNs II-XII, Oriented, Gait (stable).  

Negative for: Motor/Sensory Deficits, Aphasia, Facial Droop





- Laboratory Results


Result Diagrams: 


 09/05/18 08:54





 09/05/18 08:54





- ECG


O2 Sat by Pulse Oximetry: 98 (RA)


Pulse Ox Interpretation: Normal





Medical Decision Making


Medical Decision Making: 


Impression: Dizziness rule out intracranial process


Plan:


* Labs


* MRI Brain w/ and w/o contrast





Time: 1100


MRI Brain w/ and w/o contrast FINDINGS:





HEMORRHAGE:


None





DWI:


No evidence of an acute or early subacute infarction.





BRAIN PARENCHYMA:


No mass,mass effect or edema. There is generalized cerebral atrophy present. 

There are multiple bilateral left side greater than right side periventricular 

and deep white matter and subcortical FLAIR hyperintensities nonenhancing 

present. These hyperintensities are nonspecific -however Lyme's disease, 

demyelinization. Gliosis and vasculitides noninflammatory are also 

considerations but believed less likely. .





ENHANCEMENT:


No abnormal intracranial enhancement.





VENTRICLES:


Unremarkable. No hydrocephalus.





CRANIUM:


Unremarkable.





ORBITS:


Grossly unremarkable.





PARANASAL SINUSES/MASTOIDS:


Clear





VASCULAR SYSTEM:


Skull base flow voids intact.





OTHER FINDINGS:


None .





IMPRESSION:


No hemorrhage or mass effect.





Bilateral nonspecific white matter changes as referenced above more numerous on 

the left side in the right side. In this age group, microvascular ischemic 

disease is believe most likely. No acute infarcts seen.





Time: 1230


Case discussed with Dr. Cheng who was made aware of MRI results. He recommends 

giving patient a dose of Meclizine and to be discharged home with follow up 

with him tomorrow. pt feels well, stable gait, alert and patent airway. relayed 

plan to pt.





Scribe Attestation:


Documented by Claudine Turner, acting as a scribe for Jessica Ramirez MD.





Provider Scribe Attestation:


All medical record entries made by the Scribe were at my direction and 

personally dictated by me. I have reviewed the chart and agree that the record 

accurately reflects my personal performance of the history, physical exam, 

medical decision making, and the department course for this patient. I have 

also personally directed, reviewed, and agree with the discharge instructions 

and disposition.








Disposition





- Clinical Impression


Clinical Impression: 


 Dizziness








- Patient ED Disposition


Is Patient to be Admitted: No


Counseled Patient/Family Regarding: Studies Performed, Diagnosis, Need For 

Followup





- Disposition


Disposition: Routine/Home


Disposition Time: 12:30


Condition: IMPROVED


Additional Instructions: 


follow up with dr cheng tomorrow for reevaluation


return to the ED with any worsening or concerning symptoms


Prescriptions: 


Meclizine [Antivert] 12.5 mg PO Q6H PRN #6 tab


 PRN Reason: Dizziness


Instructions:  Vertigo (a Type of Dizziness) (DC)


Forms:  [facility] (English)

## 2018-09-05 NOTE — MRI
Date of service: 



09/05/2018



PROCEDURE:  MRI BRAIN WITH AND WITHOUT CONTRAST



HISTORY:

dizziness



COMPARISON:

None available. 



TECHNIQUE:

Multiplanar, multisequence MR images of the brain were obtained with 

and without intravenous contrast enhancement.20 cc of Omniscan was 

injected intravenously. 



FINDINGS:



HEMORRHAGE:

None



DWI:

No evidence of an acute or early subacute infarction.



BRAIN PARENCHYMA:

No mass,mass effect or edema. There is generalized cerebral atrophy 

present. There are multiple bilateral left side greater than right 

side periventricular and deep white matter and subcortical FLAIR 

hyperintensities nonenhancing present. These hyperintensities are 

nonspecific -however Lyme's disease, demyelinization. Gliosis and 

vasculitides noninflammatory are also considerations but believed 

less likely. .



ENHANCEMENT:

No abnormal intracranial enhancement.



VENTRICLES:

Unremarkable. No hydrocephalus.



CRANIUM:

Unremarkable.



ORBITS:

Grossly unremarkable.



PARANASAL SINUSES/MASTOIDS:

Clear



VASCULAR SYSTEM:

Skull base flow voids intact.



OTHER FINDINGS:

None .



IMPRESSION:

No hemorrhage or mass effect.



Bilateral nonspecific white matter changes as referenced above more 

numerous on the left side in the right side. In this age group, 

microvascular ischemic disease is believe most likely. No acute 

infarcts seen.

## 2019-01-20 ENCOUNTER — HOSPITAL ENCOUNTER (INPATIENT)
Dept: HOSPITAL 14 - H.ER | Age: 69
LOS: 4 days | Discharge: HOME | DRG: 312 | End: 2019-01-24
Attending: INTERNAL MEDICINE | Admitting: INTERNAL MEDICINE
Payer: MEDICARE

## 2019-01-20 VITALS — BODY MASS INDEX: 42.2 KG/M2

## 2019-01-20 DIAGNOSIS — Z86.73: ICD-10-CM

## 2019-01-20 DIAGNOSIS — K21.9: ICD-10-CM

## 2019-01-20 DIAGNOSIS — M19.071: ICD-10-CM

## 2019-01-20 DIAGNOSIS — M17.0: ICD-10-CM

## 2019-01-20 DIAGNOSIS — J45.909: ICD-10-CM

## 2019-01-20 DIAGNOSIS — M54.9: ICD-10-CM

## 2019-01-20 DIAGNOSIS — I10: ICD-10-CM

## 2019-01-20 DIAGNOSIS — Z23: ICD-10-CM
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
## 2019-01-20 DIAGNOSIS — S09.90XA: ICD-10-CM

## 2019-01-20 DIAGNOSIS — F41.9: ICD-10-CM

## 2019-01-20 DIAGNOSIS — M81.0: ICD-10-CM

## 2019-01-20 DIAGNOSIS — R55: Primary | ICD-10-CM

## 2019-01-20 DIAGNOSIS — M19.072: ICD-10-CM

## 2019-01-20 DIAGNOSIS — E78.5: ICD-10-CM

## 2019-01-20 DIAGNOSIS — E78.00: ICD-10-CM

## 2019-01-20 DIAGNOSIS — K80.20: ICD-10-CM

## 2019-01-20 LAB
ALBUMIN SERPL-MCNC: 4.4 G/DL (ref 3.5–5)
ALBUMIN/GLOB SERPL: 1 {RATIO} (ref 1–2.1)
ALT SERPL-CCNC: 25 U/L (ref 9–52)
APTT BLD: 34.4 SECONDS (ref 25.6–37.1)
AST SERPL-CCNC: 38 U/L (ref 14–36)
BASOPHILS # BLD AUTO: 0 K/UL (ref 0–0.2)
BASOPHILS NFR BLD: 0.4 % (ref 0–2)
BUN SERPL-MCNC: 22 MG/DL (ref 7–17)
CALCIUM SERPL-MCNC: 9.7 MG/DL (ref 8.4–10.2)
EOSINOPHIL # BLD AUTO: 0.3 K/UL (ref 0–0.7)
EOSINOPHIL NFR BLD: 2.6 % (ref 0–4)
ERYTHROCYTE [DISTWIDTH] IN BLOOD BY AUTOMATED COUNT: 14 % (ref 11.5–14.5)
GFR NON-AFRICAN AMERICAN: 55
HGB BLD-MCNC: 13.4 G/DL (ref 12–16)
INR PPP: 1.1
LYMPHOCYTES # BLD AUTO: 1.9 K/UL (ref 1–4.3)
LYMPHOCYTES NFR BLD AUTO: 15.5 % (ref 20–40)
MCH RBC QN AUTO: 28.1 PG (ref 27–31)
MCHC RBC AUTO-ENTMCNC: 32.7 G/DL (ref 33–37)
MCV RBC AUTO: 85.7 FL (ref 81–99)
MONOCYTES # BLD: 1 K/UL (ref 0–0.8)
MONOCYTES NFR BLD: 8.4 % (ref 0–10)
NEUTROPHILS # BLD: 8.9 K/UL (ref 1.8–7)
NEUTROPHILS NFR BLD AUTO: 73.1 % (ref 50–75)
NRBC BLD AUTO-RTO: 0 % (ref 0–0)
PLATELET # BLD: 303 K/UL (ref 130–400)
PMV BLD AUTO: 9.1 FL (ref 7.2–11.7)
PROTHROMBIN TIME: 12 SECONDS (ref 9.8–13.1)
RBC # BLD AUTO: 4.79 MIL/UL (ref 3.8–5.2)
WBC # BLD AUTO: 12.1 K/UL (ref 4.8–10.8)

## 2019-01-20 NOTE — ED PDOC
Syncope/Near Syncope/Dizziness


Time Seen by Provider: 01/20/19 17:11


Chief Complaint (Nursing): Weakness/Neurological Deficit


Chief Complaint (Provider): Syncope


History Per: Patient, Family (daughter)


History/Exam Limitations: no limitations


Onset/Duration Of Symptoms: Hrs (PTA)


Current Symptoms Are (Timing): Better


Activity At Onset Of Symptoms: Standing


Possible Causative Factor(s): Vertigo


Additional Complaint(s): 





69 year old female with a history of two previous strokes presents to the ED s/p

unwitnessed fall. However, daughter in law said she heard the fall and ran into 

the bathroom and found her on the ground and spent 3 minutes with her before she

regained consciousness. Patient states she has vertigo, felt dizzy and unsteady 

and then the next thing she knows she was being woken up on the floor. She r

eports pain to the right side of neck but denies headache, nausea, vomiting or 

other discomfort. 





PMD: SHELL Villela





Past Medical History


Reviewed: Historical Data, Nursing Documentation, Vital Signs


Vital Signs: 





                                Last Vital Signs











Temp  98 F   01/20/19 16:57


 


Pulse  81   01/20/19 16:57


 


Resp  18   01/20/19 16:57


 


BP  138/59 L  01/20/19 16:57


 


Pulse Ox  98   01/20/19 16:57














- Medical History


PMH: Anxiety, Arthritis, Asthma, CVA (x2), HTN, Hypercholesterolemia, 

Hyperlipidemia, Osteoporosis


   Denies: HIV, Chronic Kidney Disease





- Family History


Family History: States: Unknown Family Hx





- Immunization History


Hx Tetanus Toxoid Vaccination: No


Hx Influenza Vaccination: No


Hx Pneumococcal Vaccination: No





- Home Medications


Home Medications: 


                                Ambulatory Orders











 Medication  Instructions  Recorded


 


Albuterol HFA [Ventolin HFA 90 1 puff IH DAILY 07/04/18





mcg/actuation (8 g)]  


 


Alendronate [Fosamax] 70 mg PO QWK 07/04/18


 


Atorvastatin [Lipitor] 10 mg PO DAILY 07/04/18


 


Betamethasone Dip 0.05% [Diprolene] 1 ml TP DAILY 07/04/18


 


Escitalopram [Lexapro] 5 mg PO DAILY 07/04/18


 


Icosapent Ethyl [Vascepa] 1 gm PO DAILY 07/04/18


 


Levocetirizine Dihydrochloride 5 mg PO DAILY 07/04/18


 


RX: Ammonium Lactate 12% 1 appl TP DAILY 07/04/18





[Lac-Hydrin 12% Cream (140 g)]  


 


RX: Meclizine HCl 12.5 mg PO DAILY 07/04/18


 


RX: Metoprolol Succinate 25 mg PO DAILY 07/04/18


 


RX: Olopatadine HCl 2.5 ml OP DAILY 07/04/18


 


RX: Ropinirole HCl 0.25 mg PO DAILY 07/04/18


 


amLODIPine [Norvasc] 2.5 mg PO DAILY 07/04/18


 


RX: Meclizine [Antivert] 12.5 mg PO Q6H PRN #6 tab 09/05/18














- Allergies


Allergies/Adverse Reactions: 


                                    Allergies











Allergy/AdvReac Type Severity Reaction Status Date / Time


 


No Known Allergies Allergy   Verified 01/20/19 16:57














Review of Systems


ROS Statement: Except As Marked, All Systems Reviewed And Found Negative


Musculoskeletal: Positive for: Neck Pain





Physical Exam





- Reviewed


Nursing Documentation Reviewed: Yes


Vital Signs Reviewed: Yes





- Physical Exam


Appears: Positive for: Non-toxic, No Acute Distress


Skin: Positive for: Normal Color, Warm, Dry


Eye Exam: Positive for: Normal appearance, EOMI, PERRL


ENT: Positive for: Pharynx Is (clear), TM Is/Are (unremarkable), Other (abrasion

 just posterior to the right ear, no active bleeding)


Cardiovascular/Chest: Positive for: Regular Rate, Rhythm


Respiratory: Positive for: Normal Breath Sounds.  Negative for: Respiratory 

Distress


Gastrointestinal/Abdominal: Positive for: Normal Exam, Soft.  Negative for: 

Tenderness


Back: Positive for: Other (no tenderness to palpation of spine)


Extremity: Positive for: Normal ROM (upper and lower).  Negative for: Pedal 

Edema, Deformity


Neurologic/Psych: Positive for: Alert, Oriented (x3)





- Laboratory Results


Result Diagrams: 


                                 01/20/19 18:00





                                 01/20/19 18:00


Lab Results: 





                                        











PT  12.0 Seconds (9.8-13.1)   01/20/19  18:00    


 


INR  1.1   01/20/19  18:00    


 


APTT  34.4 Seconds (25.6-37.1)   01/20/19  18:00    








                                        











Troponin I  < 0.0120 ng/mL (0.00-0.120)   01/20/19  18:00    








                                        











Total Bilirubin  0.5 mg/dl (0.2-1.3)   01/20/19  18:00    


 


AST  38 U/L (14-36)  H D 01/20/19  18:00    


 


ALT  25 U/L (9-52)   01/20/19  18:00    


 


Alkaline Phosphatase  101 U/L ()   01/20/19  18:00    


 


Total Protein  8.6 G/DL (6.3-8.2)  H  01/20/19  18:00    


 


Albumin  4.4 g/dL (3.5-5.0)   01/20/19  18:00    


 


Globulin  4.2 gm/dL (2.2-3.9)  H  01/20/19  18:00    


 


Albumin/Globulin Ratio  1.0  (1.0-2.1)   01/20/19  18:00    














- ECG


O2 Sat by Pulse Oximetry: 98 (RA)


Pulse Ox Interpretation: Normal





Medical Decision Making


Medical Decision Making: 





Time: 1736


Workup for syncopal episode whit head trauma,


Plan:


--ct brain


--labs with cardiac enzymes


--ekg 


--reassess





Time: 1900


--Labs at this time are unremarkable, EKG unremarkable.  Brain CT shows no acute

 injury. Mostly likely admission for syncope. 





Time: 1930


--Case discussed with Dr. Villela, patient to be admitted observation/telemetry.








--------------------------

-----------------------------------------------------------------------


Scribe Attestation:


Documented by Sakina Jackson, acting as a scribe for Maru Pool MD.








Provider Scribe Attestation:


All medical record entries made by the Scribe were at my direction and 

personally dictated by me. I have reviewed the chart and agree that the record 

accurately reflects my personal performance of the history, physical exam, 

medical decision making, and the department course for this patient. I have also

 personally directed, reviewed, and agree with the discharge instructions and 

disposition.





Disposition





- Clinical Impression


Clinical Impression: 


 Syncope and collapse








- Patient ED Disposition


Is Patient to be Admitted: Yes





- Disposition


Disposition Time: 19:30


Condition: FAIR

## 2019-01-21 PROCEDURE — 3E0234Z INTRODUCTION OF SERUM, TOXOID AND VACCINE INTO MUSCLE, PERCUTANEOUS APPROACH: ICD-10-PCS | Performed by: INTERNAL MEDICINE

## 2019-01-21 RX ADMIN — METOPROLOL SUCCINATE SCH MG: 25 TABLET, EXTENDED RELEASE ORAL at 09:15

## 2019-01-21 RX ADMIN — ALBUTEROL SULFATE SCH PUFF: 90 AEROSOL, METERED RESPIRATORY (INHALATION) at 09:16

## 2019-01-21 RX ADMIN — AMMONIUM LACTATE SCH APPLIC: 12 CREAM TOPICAL at 09:07

## 2019-01-21 RX ADMIN — OLOPATADINE HYDROCHLORIDE SCH DROP: 1 SOLUTION/ DROPS OPHTHALMIC at 09:37

## 2019-01-21 NOTE — CT
Date of service: 



01/21/2019



PROCEDURE:  CT Angiography of the Brain and Neck.



HISTORY:

syncope, vertigo



COMPARISON:

None available.



TECHNIQUE:

CT angiography of the head and neck was performed following 

intravenous contrast administration.  Coronal and sagittal maximum 

intensity projection reformatted images were generated.



Contrast Dose: Visipaque 320, 90 cc



Radiation dose:



Total exam DLP = 492.22 mGy-cm.



This CT exam was performed using one or more of the following dose 

reduction techniques: Automated exposure control, adjustment of the 

mA and/or kV according to patient size, and/or use of iterative 

reconstruction technique.



FINDINGS:



INTERNAL CEREBRAL ARTERIES:

 Note is made of minimally calcified atherosclerosis of the bilateral 

cavernous internal carotid artery segments without stenosis.  The 

skull base, petrous, and supraclinoid segments are bilaterally widely 

patent.   The skull base, petrous, and supraclinoid segments are 

bilaterally widely patent. 



ANTERIOR CEREBRAL ARTERIES:

Unremarkable. A1 and A2 segments are widely patent. Smaller distal 

branches unremarkable, as visualized.



MIDDLE CEREBRAL ARTERIES:

Unremarkable. M1 and M2 segments are widely patent. Perisylvian 

branches grossly symmetric.



POSTERIOR CIRCULATION:

Basilar Artery: Unremarkable.



Distal Vertebral Arteries: Unremarkable.



Posterior Cerebral Arteries: Unremarkable.



Posterior Inferior Cerebellar Arteries: Unremarkable.



NECK CTA: There is a conjoint origin of the left common and right 

brachiocephalic arteries of the aortic arch. 



Common Carotid arteries: The bilateral common carotid appear widely 

patent from their origins to their bifurcations with no significant 

stenosis appreciated. No evidence to suggest common carotid artery 

dissection.



Internal Carotid arteries: No significant stenosis is appreciated 

throughout the cervical internal carotid artery segments bilaterally 

and there is no evidence of dissection either. There is moderate 

bilateral ectasis at the proximal and mid segments. 



External Carotid arteries: Appear unremarkable bilaterally. 



Vertebral arteries: The bilateral vertebral arteries appear normal in 

caliber from their origins to their distal cervical segments. No 

significant stenosis or definite pattern of dissection.  



ANEURYSM/ VASCULAR MALFORMATIONS:

None.



OTHER FINDINGS:

None. 



IMPRESSION:

CT Angiography of the Brain is remarkable only for minimal 

atherosclerosis of the cavernous internal artery segments bilaterally 

without stenosis. No large vessel occlusion or severe stenosis 

identified. 



Conjoint origin left common carotid and right brachiocephalic 

arteries off the aortic arch. No occlusion or significant stenosis 

appreciate in the cervical segments of the bilateral common or 

internal carotid arteries or bilateral vertebral arteries.

## 2019-01-21 NOTE — CT
Date of service: 



01/20/2019



PROCEDURE:  CT HEAD WITHOUT CONTRAST.



HISTORY:

fall with LOC, 2 previous stroke



COMPARISON:

None available.



TECHNIQUE:

Axial computed tomography images were obtained through the head/brain 

without intravenous contrast.  



Radiation dose:



Total exam DLP = 839.16 mGy-cm.



This CT exam was performed using one or more of the following dose 

reduction techniques: Automated exposure control, adjustment of the 

mA and/or kV according to patient size, and/or use of iterative 

reconstruction technique.



FINDINGS:



HEMORRHAGE:

No intracranial hemorrhage. 



BRAIN:

No mass effect or edema.  Mild diffuse age-appropriate cerebral 

atrophy.  Patchy periventricular and deep white matter lucency 

consistent with microvascular white matter ischemic change.  No 

evidence of acute infarct.



VENTRICLES:

Unremarkable. No hydrocephalus. 



CALVARIUM:

Unremarkable.



PARANASAL SINUSES:

Unremarkable as visualized. No significant inflammatory changes.



MASTOID AIR CELLS:

Unremarkable as visualized. No inflammatory changes.



OTHER FINDINGS:

None.



IMPRESSION:

No intracranial mass, hemorrhage or evidence of acute infarct.  

Chronic white matter ischemic change and mild atrophy.



The preliminary findings for this examination were reported by [facility] at 7:24 p.m. on 1/20/2019.  There is concurrence of this 

report with the preliminary findings.

## 2019-01-21 NOTE — RAD
Date of service: 



01/21/2019



PROCEDURE:  Radiographs of the Lumbar Spine.



HISTORY:

pain sp fall







COMPARISON:

No prior.



FINDINGS:



BONES:

Vertebral bodies maintained in height.  There is grade 1 

anterolisthesis at L4-5.  There is no evidence of spondylolysis.  

Normal alignment is maintained elsewhere.  The transverse processes 

and posterior elements appear intact.



DISC SPACES:

Narrowing of the L4-5 disc space.  Consistent with degenerative disc 

disease.  The remaining intervertebral disc spaces are preserved in 

height. 



OTHER FINDINGS:

None.



IMPRESSION:

Degenerative disc disease at L4-5 with grade 1 anterolisthesis at 

L4-5, likely degenerative in origin.  No evidence of fracture.

## 2019-01-21 NOTE — CARD
--------------- APPROVED REPORT --------------





Date of service: 01/20/2019



EKG Measurement

Heart [id number]

[place] [id number]

[id number]

[id number]

[id number]



<Conclusion>

Normal sinus rhythm

Moderate voltage criteria for LVH, may be normal variant

Borderline ECG

## 2019-01-21 NOTE — CT
Date of service: 



01/20/2019



PROCEDURE:  CT Cervical Spine without contrast



HISTORY:

fall with LOC, neck pain



COMPARISON:

None available.



TECHNIQUE:

Axial computed tomography images were obtained of the cervical spine 

without the use of intravenous contrast. Coronal and sagittal 

reformatted images were created and reviewed.



Radiation dose:



Total exam DLP = 431.0 mGy-cm.



This CT exam was performed using one or more of the following dose 

reduction techniques: Automated exposure control, adjustment of the 

mA and/or kV according to patient size, and/or use of iterative 

reconstruction technique.



FINDINGS:



VERTEBRAE:

Vertebral bodies are maintained in height.  The atlantoaxial 

articulation and odontoid process are intact.  Normal alignment is 

maintained.  There is straightening of the normal lordotic curvature 

of the cervical spine indicating possible muscular spasm. 



DISCS/SPINAL CANAL/NEURAL FORAMINA:

There is marked narrowing of the C3-4, C4-5 and C5-6 intervertebral 

disc spaces consistent with degenerative disc disease.  The remaining 

intervertebral disc spaces are maintained in height. 



PARASPINAL SOFT TISSUES:

No prevertebral soft tissue swelling. 



OTHER FINDINGS:

Nodular calcifications in left lobe of thyroid.  Recommend 

correlation with thyroid ultrasound for possible multinodular thyroid.



IMPRESSION:

No fracture/dislocation.  Possible muscular spasm.  Multilevel 

degenerative disc disease.  Nodular calcifications in left lobe of 

thyroid for which ultrasound thyroid examination is advised.

## 2019-01-21 NOTE — CP.PCM.CON
History of Present Illness





- History of Present Illness


History of Present Illness: 





Neurology Consultation Note:


Consult requested by Dr. Villela





The patient is a 69-year-old woman, who I have seen as an outpatient in the past

for vertigo/syncope, with a past medical history of anxiety, arthritis, asthma, 

previous stroke, HTN, HLD, osteoporosis, who developed vertigo at home with 

balance difficulty, dizziness, lightheadedness, followed by a syncopal episode 

with LOC for about 3 minutes. Non-contrast CT scan of the head did not show any 

concerning findings. She states that she has had 3 previous syncopal episodes 

with palpitations, shortness of breath and light-headedness. 





Review of Systems





- Constitutional


Constitutional: As Per HPI





- EENT


Eyes: absent: As Per HPI, Blind Spots, Blurred Vision, Change in Vision, 

Decreased Night Vision, Diplopia, Discharge, Dry Eye, Exophthalmos, Floaters, 

Irritation, Itchy Eyes, Loss of Peripheral Vision, Pain, Photophobia, Requires 

Corrective Lenses, Sees Flashes, Spots in Vision, Tunnel Vision, Other Visual 

Disturbances, Loss of Vision, Other


Ears: absent: As Per HPI, Decreased Hearing, Ear Discharge, Ear Pain, Tinnitus, 

Abnormal Hearing, Disequilibrium, Dizziness, Other


Nose/Mouth/Throat: absent: As Per HPI, Epistaxis, Nasal Congestion, Nasal 

Discharge, Nasal Obstruction, Nasal Trauma, Nose Pain, Post Nasal Drip, Sinus 

Pain, Sinus Pressure, Bleeding Gums, Change in Voice, Dental Pain, Dry Mouth, 

Dysphagia, Halitosis, Hoarsness, Lip Swelling, Mouth Lesions, Mouth Pain, 

Odynophagia, Sore Throat, Throat Swelling, Tongue Swelling, Facial Pain, Neck 

Pain, Neck Mass, Other





- Cardiovascular


Cardiovascular: As Per HPI, Syncope





- Respiratory


Respiratory: absent: As Per HPI, Cough, Dyspnea, Hemoptysis, Dyspnea on 

Exertion, Wheezing, Snoring, Stridor, Pain on Inspiration, Chest Congestion, 

Excessive Mucous Production, Change in Mucous Color, Pain with Coughing, Other





- Gastrointestinal


Gastrointestinal: absent: As Per HPI, Abdominal Pain, Belching, Bloating, Change

in Bowel Habits, Change in Stool Character, Coffee Ground Emesis, Constipation, 

Cramping, Diarrhea, Dyspepsia, Dysphagia, Early Satiety, Excessive Flatus, Fecal

Incontinence, Heartburn, Hematemesis, Hematochezia, Loose Stools, Melena, 

Nausea, Odynophagia, Temesmus, Vomiting, Other





- Genitourinary


Genitourinary: absent: As Per HPI, Change in Urinary Stream, Difficulty 

Urinating, Dysuria, Flank Pain, Hematuria, Pyuria, Nocturia, Urinary 

Incontinence, Urinary Frequency, Urinary Hesitance, Urinary Urgency, Voiding 

Freq/Small Amts, Freq UTI, Hx Renal/Bladder Calculi, Hx /Renal Surgery, 

Bladder Distension, Other





- Musculoskeletal


Musculoskeletal: absent: As Per HPI, Abnormal Gait, Arthralgias, Atrophy, Back 

Pain, Deformity, Joint Swelling, Limited Range of Motion, Loss of Height, Muscle

Cramps, Muscle Weakness, Myalgias, Neck Pain, Numbness, Radiating Pain into 

Limb, Stiffness, Tingling, Other





- Integumentary


Integumentary: absent: As Per HPI, Acne, Alopecia, Bleeding Lesions, Change in 

Hair, Change in Nails, Change in Pigmentation, Changing Lesions, Dry Skin, 

Erythema, Furuncle, Hirsutism, Lesions, New Lesions, Non-Healing Lesions, 

Photosensitivity, Pruritus, Rash, Skin Pain, Skin Ulcer, Sores, Striae, 

Swelling, Unusual Bruising, Wounds, Jaundice, Other





- Neurological


Neurological: As Per HPI





- Psychiatric


Psychiatric: absent: As Per HPI, Abnormal Sleep Pattern, Anhedonia, Anxiety, 

Auditory Hallucinations, Behavioral Changes, Change in Appetite, Change in 

Libido, Confusion, Depression, Difficulty Concentrating, Hallucinations, 

Homicidal Ideation, Hopelessness, Irritability, Memory Loss, Mood Swings, Panic 

Attacks, Paranoia, Suicidal Ideation, Visual Hallucinations, Tactile 

Hallucinations, Other





- Endocrine


Endocrine: absent: As Per HPI, Change in Body Appearance, Change in Libido, Cold

Intolorance, Deepening of Voice, Excessive Sweating, Fatigue, Flushing, Heat 

Intolorance, Increase in Ring/Shoe/Hat Size, Palpitations, Polydipsia, 

Polyphagia, Polyuria, Other





Past Patient History





- Past Medical History & Family History


Past Medical History?: Yes





- Past Social History


Smoking Status: Never Smoked





- CARDIAC


Hx Hypercholesterolemia: Yes


Hx Hypertension: Yes





- PULMONARY


Hx Asthma: Yes





- NEUROLOGICAL


Hx Neurological Disorder: Yes


Hx Dizziness: Yes





- HEENT


Hx HEENT Problems: Yes


Hx Cataracts: Yes (BILATERAL)





- RENAL


Hx Chronic Kidney Disease: No





- ENDOCRINE/METABOLIC


Hx Endocrine Disorders: No





- HEMATOLOGICAL/ONCOLOGICAL


Hx Human Immunodeficiency Virus (HIV): No





- INTEGUMENTARY


Hx Dermatological Problems: No





- MUSCULOSKELETAL/RHEUMATOLOGICAL


Hx Falls: Yes





- GASTROINTESTINAL


Hx Gastrointestinal Disorders: Yes


Hx Gastroesophageal Reflux: Yes


HX Swallowing Problems: Yes





- GENITOURINARY/GYNECOLOGICAL


Hx Genitourinary Disorders: No





- PSYCHIATRIC


Hx Substance Use: No





- SURGICAL HISTORY


Hx Surgeries: Yes


Hx Cataract Extraction: Yes (BILATERAL)


Other/Comment: HX OF VEIN STRIPPING AND LIGATION





- ANESTHESIA


Hx Anesthesia: Yes


Hx Anesthesia Reactions: No


Hx Malignant Hyperthermia: No





Meds


Allergies/Adverse Reactions: 


                                    Allergies











Allergy/AdvReac Type Severity Reaction Status Date / Time


 


No Known Allergies Allergy   Verified 01/20/19 16:57














- Medications


Medications: 


                               Current Medications





Albuterol (Ventolin Hfa 90 Mcg/Actuation (8 G))  1 puff IH DAILY [facility]


   Last Admin: 01/21/19 09:16 Dose:  1 puff


Alendronate Sodium (Fosamax)  70 mg PO QWK [facility]


Amlodipine Besylate (Norvasc)  2.5 mg PO DAILY [facility]


   Last Admin: 01/21/19 09:08 Dose:  2.5 mg


Atorvastatin Calcium (Lipitor)  10 mg PO DAILY [facility]


   Last Admin: 01/21/19 09:08 Dose:  10 mg


Escitalopram Oxalate (Lexapro)  5 mg PO DAILY [facility]


   Last Admin: 01/21/19 09:08 Dose:  5 mg


Home Med (Icosapent Ethyl [Vascepa])  1 gm PO DAILY [facility]


Home Med (Ropinirole Hcl [Ropinirole Hcl])  0.25 mg PO DAILY [facility]


Lactic Acid (Lac-Hydrin 12% Cream (140 G))  1 ea TOP DAILY [facility]


   Last Admin: 01/21/19 09:07 Dose:  1 applic


Loratadine (Claritin)  10 mg PO DAILY [facility]


   Last Admin: 01/21/19 09:07 Dose:  10 mg


Meclizine HCl (Antivert)  12.5 mg PO Q6H PRN


   PRN Reason: Dizziness


Metoprolol Succinate (Toprol Xl)  25 mg PO DAILY [facility]


   Last Admin: 01/21/19 09:15 Dose:  25 mg


Olopatadine HCl (Patanol 0.1% Opht Soln)  1 drop OU DAILY [facility]


   Last Admin: 01/21/19 09:37 Dose:  1 drop











Physical Exam





- Constitutional


Appears: Well





- Head Exam


Head Exam: ATRAUMATIC, NORMAL INSPECTION, NORMOCEPHALIC





- Eye Exam


Eye Exam: EOMI, Normal appearance, PERRL


Pupil Exam: NORMAL ACCOMODATION, PERRL





- ENT Exam


ENT Exam: Mucous Membranes Moist, Normal Exam





- Neck Exam


Neck exam: Positive for: Normal Inspection





- Respiratory Exam


Respiratory Exam: Clear to Auscultation Bilateral, NORMAL BREATHING PATTERN





- Cardiovascular Exam


Cardiovascular Exam: REGULAR RHYTHM, +S1, +S2





- GI/Abdominal Exam


GI & Abdominal Exam: Normal Bowel Sounds, Soft.  absent: Tenderness





- Rectal Exam


Rectal Exam: Deferred





- Extremities Exam


Extremities exam: Positive for: normal inspection





- Back Exam


Back exam: NORMAL INSPECTION





- Neurological Exam


Neurological exam: Alert, CN II-XII Intact, Normal Gait, Oriented x3, Reflexes 

Normal


Additional comments: 





No nystagmus, no ataxia.





- Psychiatric Exam


Psychiatric exam: Normal Affect, Normal Mood





- Skin


Skin Exam: Dry, Intact, Normal Color, Warm





Results





- Vital Signs


Recent Vital Signs: 


                                Last Vital Signs











Temp  98.1 F   01/21/19 07:52


 


Pulse  102 H  01/21/19 09:15


 


Resp  20   01/21/19 07:52


 


BP  121/77   01/21/19 09:15


 


Pulse Ox  95   01/21/19 07:52














- Labs


Result Diagrams: 


                                 01/20/19 18:00





                                 01/20/19 18:00


Labs: 


                         Laboratory Results - last 24 hr











  01/20/19 01/20/19 01/20/19





  17:12 18:00 18:00


 


WBC   12.1 H D 


 


RBC   4.79 


 


Hgb   13.4 


 


Hct   41.0 


 


MCV   85.7 


 


MCH   28.1 


 


MCHC   32.7 L 


 


RDW   14.0 


 


Plt Count   303 


 


MPV   9.1 


 


Neut % (Auto)   73.1 


 


Lymph % (Auto)   15.5 L 


 


Mono % (Auto)   8.4 


 


Eos % (Auto)   2.6 


 


Baso % (Auto)   0.4 


 


Neut # (Auto)   8.9 H 


 


Lymph # (Auto)   1.9 


 


Mono # (Auto)   1.0 H 


 


Eos # (Auto)   0.3 


 


Baso # (Auto)   0.0 


 


PT   


 


INR   


 


APTT   


 


Sodium    139


 


Potassium    3.8


 


Chloride    94 L


 


Carbon Dioxide    31 H


 


Anion Gap    18


 


BUN    22 H


 


Creatinine    1.0


 


Est GFR ( Amer)    > 60


 


Est GFR (Non-Af Amer)    55


 


POC Glucose (mg/dL)  126 H  


 


Random Glucose    109 H


 


Calcium    9.7


 


Total Bilirubin    0.5


 


AST    38 H D


 


ALT    25


 


Alkaline Phosphatase    101


 


Troponin I    < 0.0120


 


Total Protein    8.6 H


 


Albumin    4.4


 


Globulin    4.2 H


 


Albumin/Globulin Ratio    1.0


 


Blood Type   


 


Antibody Screen   


 


BBK History Checked   














  01/20/19 01/20/19





  18:00 18:00


 


WBC  


 


RBC  


 


Hgb  


 


Hct  


 


MCV  


 


MCH  


 


MCHC  


 


RDW  


 


Plt Count  


 


MPV  


 


Neut % (Auto)  


 


Lymph % (Auto)  


 


Mono % (Auto)  


 


Eos % (Auto)  


 


Baso % (Auto)  


 


Neut # (Auto)  


 


Lymph # (Auto)  


 


Mono # (Auto)  


 


Eos # (Auto)  


 


Baso # (Auto)  


 


PT  12.0 


 


INR  1.1 


 


APTT  34.4 


 


Sodium  


 


Potassium  


 


Chloride  


 


Carbon Dioxide  


 


Anion Gap  


 


BUN  


 


Creatinine  


 


Est GFR ( Amer)  


 


Est GFR (Non-Af Amer)  


 


POC Glucose (mg/dL)  


 


Random Glucose  


 


Calcium  


 


Total Bilirubin  


 


AST  


 


ALT  


 


Alkaline Phosphatase  


 


Troponin I  


 


Total Protein  


 


Albumin  


 


Globulin  


 


Albumin/Globulin Ratio  


 


Blood Type   O POSITIVE


 


Antibody Screen   Negative


 


BBK History Checked   No verified bt














Assessment & Plan


(1) Syncope and collapse


Assessment and Plan: 


Based on the patient's history of previous ischemic strokes, complaints of 

vertigo and gait instability prior to syncopal episode, the patient may have 

vertebro-basilar insufficiency.  Seizure is less likely since there is no report

of seizure-like activity.  I recommend obtaining CTA of the head/neck for 

further evaluation.  Continue cardiac work-up for neurocardiogenic sources or 

cardiac dysrrhythmia.  Fluids with NS at 100 mL/hr.  PT/OT eval. 





At this point, since these episodes have been happening and we still do not have

a clear cause, I would recommend insertion of a Linq device for syncope of 

unknown cause/origin.  





Thank you for this consultation. 


Status: Acute

## 2019-01-21 NOTE — RAD
Date of service: 



01/20/2019



HISTORY:

 possible admission 



COMPARISON:

Chest radiograph dated 12/23/2017. 



FINDINGS:



LUNGS:

No active pulmonary disease.



PLEURA:

No significant pleural effusion identified, no pneumothorax apparent.



CARDIOVASCULAR:

Aortic atherosclerotic calcifications.  Cardiomediastinal silhouette 

stably enlarged 



OSSEOUS STRUCTURES:

Unchanged.



VISUALIZED UPPER ABDOMEN:

Normal.



OTHER FINDINGS:

None.



IMPRESSION:

No active disease.

## 2019-01-22 LAB
BUN SERPL-MCNC: 15 MG/DL (ref 7–17)
CALCIUM SERPL-MCNC: 9.6 MG/DL (ref 8.4–10.2)
ERYTHROCYTE [DISTWIDTH] IN BLOOD BY AUTOMATED COUNT: 13.9 % (ref 11.5–14.5)
GFR NON-AFRICAN AMERICAN: > 60
HGB BLD-MCNC: 13.3 G/DL (ref 12–16)
MCH RBC QN AUTO: 28.6 PG (ref 27–31)
MCHC RBC AUTO-ENTMCNC: 33.3 G/DL (ref 33–37)
MCV RBC AUTO: 85.9 FL (ref 81–99)
PLATELET # BLD: 249 K/UL (ref 130–400)
RBC # BLD AUTO: 4.64 MIL/UL (ref 3.8–5.2)
WBC # BLD AUTO: 7.9 K/UL (ref 4.8–10.8)

## 2019-01-22 RX ADMIN — AMMONIUM LACTATE SCH APPLIC: 12 CREAM TOPICAL at 08:24

## 2019-01-22 RX ADMIN — OLOPATADINE HYDROCHLORIDE SCH DROP: 1 SOLUTION/ DROPS OPHTHALMIC at 12:33

## 2019-01-22 RX ADMIN — OLOPATADINE HYDROCHLORIDE SCH: 1 SOLUTION/ DROPS OPHTHALMIC at 08:25

## 2019-01-22 RX ADMIN — METOPROLOL SUCCINATE SCH MG: 25 TABLET, EXTENDED RELEASE ORAL at 08:28

## 2019-01-22 RX ADMIN — ALBUTEROL SULFATE SCH PUFF: 90 AEROSOL, METERED RESPIRATORY (INHALATION) at 08:29

## 2019-01-22 NOTE — CP.PCM.CON
History of Present Illness





- History of Present Illness


History of Present Illness: 





THE PATIENT IS A 69 YEAR OLD FEMALE WHO HAD POST MICTURATION DIZZINESS AND FELL 

TO THE FLOOR AND HAD A SYNCOPAL OR NEAR SYNCOPAL EPISODE FOR A FEW MINUTES AND 

WAS FOUND BY HER DAUGHTER AND BROUGHT TO THE ER AND ADMITTED.  SHE HAS A HISTORY

OF VERTIGO, SYNCOPE IN THE PAST, AN OLD CVA, HYPERTENSION AND HYPERLIPIDEMIA.  I

WAS ASKED TO SEE HER BY DR LAW.  SHE DENIES ANY CAD HISTORY OR PRIOR MI.  SHE 

DENIES ANY SUSTAINED PALPITATIONS AS SUCH BUT SEEMS TO DESCRIBE AN OCCASIONAL 

SKIPPED BEAT.





Past Patient History





- Past Medical History & Family History


Past Medical History?: Yes





- Past Social History


Smoking Status: Never Smoked





- CARDIAC


Hx Hypercholesterolemia: Yes


Hx Hypertension: Yes





- PULMONARY


Hx Asthma: Yes





- NEUROLOGICAL


Hx Neurological Disorder: Yes


Hx Dizziness: Yes





- HEENT


Hx HEENT Problems: Yes


Hx Cataracts: Yes (BILATERAL)





- RENAL


Hx Chronic Kidney Disease: No





- ENDOCRINE/METABOLIC


Hx Endocrine Disorders: No





- HEMATOLOGICAL/ONCOLOGICAL


Hx Human Immunodeficiency Virus (HIV): No





- INTEGUMENTARY


Hx Dermatological Problems: No





- MUSCULOSKELETAL/RHEUMATOLOGICAL


Hx Arthritis: Yes


Hx Osteoporosis: Yes





- GASTROINTESTINAL


Hx Gastrointestinal Disorders: Yes


Hx Gastroesophageal Reflux: Yes


HX Swallowing Problems: Yes





- GENITOURINARY/GYNECOLOGICAL


Hx Genitourinary Disorders: No





- PSYCHIATRIC


Hx Anxiety: Yes





- SURGICAL HISTORY


Hx Surgeries: Yes


Hx Cataract Extraction: Yes (BILATERAL)


Other/Comment: HX OF VEIN STRIPPING AND LIGATION





- ANESTHESIA


Hx Anesthesia: Yes


Hx Anesthesia Reactions: No


Hx Malignant Hyperthermia: No





Meds


Allergies/Adverse Reactions: 


                                    Allergies











Allergy/AdvReac Type Severity Reaction Status Date / Time


 


No Known Allergies Allergy   Verified 01/20/19 16:57














- Medications


Medications: 


                               Current Medications





Albuterol (Ventolin Hfa 90 Mcg/Actuation (8 G))  1 puff IH DAILY [facility]


   Last Admin: 01/22/19 08:29 Dose:  1 puff


Alendronate Sodium (Fosamax)  70 mg PO QWK Replaced by [facility]


Atorvastatin Calcium (Lipitor)  10 mg PO DAILY [facility]


   Last Admin: 01/22/19 08:25 Dose:  10 mg


Escitalopram Oxalate (Lexapro)  5 mg PO DAILY [facility]


   Last Admin: 01/22/19 08:24 Dose:  5 mg


Lactic Acid (Lac-Hydrin 12% Cream (140 G))  1 ea TOP DAILY [facility]


   Last Admin: 01/22/19 08:24 Dose:  1 applic


Loratadine (Claritin)  10 mg PO DAILY [facility]


   Last Admin: 01/22/19 08:23 Dose:  10 mg


Meclizine HCl (Antivert)  12.5 mg PO Q6H PRN


   PRN Reason: Dizziness


Metoprolol Succinate (Toprol Xl)  25 mg PO DAILY [facility]


   Last Admin: 01/22/19 08:28 Dose:  25 mg


Olopatadine HCl (Patanol 0.1% Opht Soln)  1 drop OU DAILY [facility]


   Last Admin: 01/22/19 08:25 Dose:  Not Given











Physical Exam





- Respiratory Exam


Respiratory Exam: Clear to Auscultation Bilateral





- Cardiovascular Exam


Cardiovascular Exam: REGULAR RHYTHM, +S1, +S2





- Extremities Exam


Additional comments: 





NO LE EDEMA





VV PRESENT, NO CALF PAIN





- Additional Findings


Additional findings: 





EKG NST





EKG MONITOR NSR AT NORMAL RATES, NO BRADYCARDIA, PAUSES OR BLOCKS





TROPONIN NEGATIVE





CXR NAD





CT AND MRA REPORTS REVIEWED





NEUROLOGY NOTE REVIEWED





Results





- Vital Signs


Recent Vital Signs: 


                                Last Vital Signs











Temp  98.1 F   01/22/19 08:04


 


Pulse  62   01/22/19 09:00


 


Resp  18   01/22/19 08:04


 


BP  106/59 L  01/22/19 08:28


 


Pulse Ox  98   01/22/19 08:04














- Labs


Result Diagrams: 


                                 01/20/19 18:00





                                 01/20/19 18:00


Labs: 


                         Laboratory Results - last 24 hr











  01/21/19





  11:06


 


POC Glucose (mg/dL)  91














Assessment & Plan





- Assessment and Plan (Free Text)


Assessment: 





POSSIBLE POST MICTURATION SYNCOPE BY HISTORY.  VERTIGO?





STABLE CARDIAC STSTUS AT THE PRESENT TIME.  SHE DOES NOT DESCRIBE SUSTAINED 

PALPITATIONS TO ME





HYPERTENSION





HYPERLIPIDEMIA


Plan: 





THE PATIENT WAS ADMITTED TO  ON TEMETRY





SHE IS ON MECLIZINE, ATORVASTATIN AND METOPROLOL





ECHOCARDIOGRAM ORDERED

## 2019-01-22 NOTE — US
Date of service: 



01/22/2019



HISTORY:

abdominal pain



COMPARISON:

None.



TECHNIQUE:

Sonographic evaluation of the abdomen.



FINDINGS:



LIVER:

Measures 12.8 cm.  Normal echogenicity of the liver parenchyma. No 

mass. No intrahepatic bile duct dilatation.



GALLBLADDER:

Cholelithiasis with borderline gallbladder wall thickening.  

Sonographic Cowan's sign was not elicited.



COMMON BILE DUCT:

Measures 4 mm. No stones. No dilatation.



PANCREAS:

Unremarkable as visualized. No mass. No ductal dilatation.



RIGHT KIDNEY:

Measures 11.4 x 3.9 x 3.8cm.  Normal echogenicity. No calculus, mass, 

or hydronephrosis.



LEFT KIDNEY:

Measures 11.9 x 4.1 x 3.9cm.  Normal echogenicity. No calculus, mass, 

or hydronephrosis.



SPLEEN:

Normal in size and contour. No mass.



AORTA:

No aneurysmal dilatation. 



IVC:

Unremarkable. 



OTHER FINDINGS:

None. 



IMPRESSION:

Cholelithiasis with borderline gallbladder wall thickening.  Findings 

are equivocal for acute cholecystitis.  If there is concern for acute 

cholecystitis, nuclear medicine hepatobiliary scan is recommended to 

further evaluate patency of the cystic duct.

## 2019-01-22 NOTE — CARD
--------------- APPROVED REPORT --------------





Date of service: 01/22/2019



EXAM: Two-dimensional and M-mode echocardiogram with Doppler and 

color Doppler.



Other Information 

Quality : GoodRhythm : NSR



INDICATION

Syncope 



2D DIMENSIONS 

IVSd1.27   (0.7-1.1cm)LVDd4.09   (3.9-5.9cm)

LVOT Diameter2.39   (1.8-2.4cm)PWd1.13   (0.7-1.1cm)

IVSs1.29   (0.8-1.2cm)LVDs2.93   (2.5-4.0cm)

FS (%) 28.5   %PWs1.58   (0.8-1.2cm)



M-Mode DIMENSIONS 

Left Atrium (MM)3.71   (2.5-4.0cm)IVSd0.85   (0.7-1.1cm)

Aortic Root2.97   (2.2-3.7cm)LVDd5.44   (4.0-5.6cm)

Aortic Cusp Exc.1.62   (1.5-2.0cm)PWd0.79   (0.7-1.1cm)

IVSs1.09   cmFS (%) 38   %

LVDs3.38   (2.0-3.8cm)PWs1.21   cm



Aortic Valve

AoV Peak Tlkomiar066.4cm/sAoV VTI35.0cmAO Peak GR.13mmHg

LVOT Peak Sxpomqyt977.7cm/sLVOT VTI21.09cmAO Mean GR.6mmHg

LES (VMAX)1.71xx4BMN (VTI)1.30cm2



Mitral Valve

MV E Kqqshhej03.7cm/sMV DECEL [id number] A Axsibydq31.3cm/s

MV CBE51sgO/A ratio0.8MVA (PHT)3.49cm2



TDI

Lateral E' Peak V12.30cm/sMedial E' Peak V7.25cm/sE/Lateral E'5.6

E/Medial E'9.5



Tricuspid Valve

TR Peak Kbbqxejo635pa/sRAP IXMPHNEY24rcKsCO Peak Gr.19mmHg

[id number]



 LEFT VENTRICLE 

The left ventricle is normal size.

There is normal left ventricular wall thickness.

The left ventricular systolic function is normal.

The estimated ejection fraction is 55-60%

No regional wall motion abnormalities noted..

Transmitral Doppler flow pattern is Grade I-abnormal relaxation 

pattern.

No left ventricle thrombus noted on this study.

There is no ventricular septal defect visualized.

There is no left ventricular aneurysm.

There is no mass noted in the left ventricle.



 RIGHT VENTRICLE 

The right ventricle is normal size.

There is normal right ventricular wall thickness.

The right ventricular systolic function is normal.



 ATRIA 

The left atrium size is normal.

The right atrium size is normal.

The interatrial septum is intact with no evidence for an atrial 

septal defect.



 AORTIC VALVE 

The aortic valve is normal in structure.

Mild aortic regurgitation is present.

There is no aortic valvular stenosis.

There is no aortic valvular vegetation.



 MITRAL VALVE 

The mitral valve is normal in structure.

There is no evidence of mitral valve prolapse.

There is no mitral valve stenosis.

There is trace to mild mitral valve regurgitation noted.



 TRICUSPID VALVE 

The tricuspid valve is normal in structure.

There is trace tricuspid valve regurgitation noted. RVSP is 

calculated at 25 mm Hg. 

There is no tricuspid valve prolapse or vegetation.

There is no tricuspid valve stenosis.



 PULMONIC VALVE 

The pulmonary valve is normal in structure.

There is no pulmonic valvular regurgitation.

There is no pulmonic valvular stenosis.



 GREAT VESSELS 

The aortic root is normal in size.

The ascending aorta is normal in size.

The pulmonary artery is normal.

The IVC is normal in size and collapses >50% with inspiration.



 PERICARDIAL EFFUSION 

There is no pericardial effusion.

There is no pleural effusion.



<Conclusion>

The estimated ejection fraction is 55-60%

Transmitral Doppler flow pattern is Grade I-abnormal relaxation 

pattern.

The left atrium size is normal.

Mild aortic regurgitation is present.

There is trace tricuspid valve regurgitation noted. RVSP is 

calculated at 25 mm Hg.

## 2019-01-22 NOTE — HP
HISTORY OF PRESENT ILLNESS:  The patient is a 69-year-old Citizen of [place] female

with history of multiple medical problems, was brought to emergency room

after passing out at home.  The patient stated that after she micturated

she stood up and she felt dizzy after which she fell to the ground. 

Daughter who lives with the patient at home heard the bang and came to the

patient where found her semiconscious.  Ambulance was called and the

patient was brought to emergency room for further evaluation.  The patient

has had trauma to the right parietal area.  Other review of systems is

negative.



ALLERGIES:  NO KNOWN ALLERGY.



MEDICATIONS:  As per MAR, reviewed and ordered.



SOCIAL HISTORY:  No history of smoking, EtOH or substance abuse.



FAMILY HISTORY:  Not contributory.



PHYSICAL EXAMINATION:

GENERAL:  The patient is in bed, not in any cardiopulmonary distress.

VITAL SIGNS:  Blood pressure 121/74, temperature 98.1, respiratory rate 17,

and pulse 65.

HEENT:  Pupils equal, reactive to light.  Normal-appearing mucosa of the

conjunctivae, oropharynx and nasal membrane mucosa.

NECK:  Supple.  No JVD.  No carotid bruit.  No lymph node.  No thyromegaly.

CHEST AND LUNGS:  Bilateral symmetrical expansion.  Good air exchange.  No

rales, no rhonchi.

CARDIOVASCULAR SYSTEM:  PMI not localized.  S1, S2.  No additional sounds.

ABDOMEN:  Normoactive bowel sounds.  No tenderness.  No organomegaly.  No

masses.

EXTREMITIES:  No cyanosis, no clubbing, no edema.

CNS:  Alert, awake, oriented x2.  No neurological deficit could be

appreciated.



ASSESSMENT:

1.  Likely post-micturition syncope.

2.  Hypertension.

3.  Osteoarthritis.

4.  Back pain.

5.  History of benign positional vertigo.



PLAN:  We will do neuro check every 4 hours, neurology and cardiology

consultation, and resume the patient's home medications.  We will check

blood pressure lying down and standing.







__________________________________________

Rosalind Villela MD





DD:  01/21/2019 21:03:57

DT:  01/21/2019 21:05:59

Job # [id number]

## 2019-01-23 RX ADMIN — METOPROLOL SUCCINATE SCH: 25 TABLET, EXTENDED RELEASE ORAL at 09:01

## 2019-01-23 RX ADMIN — OLOPATADINE HYDROCHLORIDE SCH DROP: 1 SOLUTION/ DROPS OPHTHALMIC at 08:52

## 2019-01-23 RX ADMIN — ALBUTEROL SULFATE SCH PUFF: 90 AEROSOL, METERED RESPIRATORY (INHALATION) at 08:52

## 2019-01-23 RX ADMIN — AMMONIUM LACTATE SCH APPLIC: 12 CREAM TOPICAL at 08:59

## 2019-01-23 NOTE — PN
DATE:  01/23/2019



SUBJECTIVE:  She is not in any cardiopulmonary distress, was complaining of

lower extremity pain.  The patient's abdominal pain is intermittent and the

abdominal ultrasound showed cholelithiasis.



PHYSICAL EXAMINATION:

VITAL SIGNS:  Blood pressure is 114/73, temperature 97.8, respiratory rate

20 and pulse, 64.

HEENT:  Pupils equal, reactive to light.  Normal-appearing mucosa of the

conjunctivae, oropharynx and nasal membrane mucosa.

NECK:  Supple.  No JVD.  No carotid bruit.  No lymph node.  No thyromegaly.

CHEST AND LUNGS:  Bilateral symmetrical expansion.  Good air exchange.  No

rales, no rhonchi.

CARDIOVASCULAR:  PMI not localized.  S1, S2.  No additional sounds.

ABDOMEN:  Normoactive bowel sounds.  No tenderness.  No organomegaly.  No

masses.

EXTREMITIES:  No cyanosis, no clubbing, no edema.

CENTRAL NERVOUS SYSTEM:  Alert, awake, oriented x2.  No neurological

deficit could be appreciated.



ASSESSMENT:

1.  Syncope, likely post-micturition.

2.  Cholelithiasis.

3.  Hypertension.

4.  Bilateral osteoarthritis of the knee and ankle.



PLAN:  We will do HIDA scan.  Continue current medications and management,

physical therapy.







__________________________________________

Rosalind Villela MD





DD:  01/23/2019 22:33:56

DT:  01/23/2019 22:34:21

Job # [id number]

## 2019-01-23 NOTE — NM
Date of service: 



01/23/2019



PROCEDURE:  Nuclear Medicine Hepatobiliary Scan



HISTORY:

r/o cholecystitis



COMPARISON:

Comparison is made with the previous ultrasound of the abdomen dated 

01/22/2019 



TECHNIQUE:

5.27 mCi of technetium 99m Mebrofenin was administered intravenously. 

Planar images of the abdomen were obtained at 5 min intervals to 60 

mins. Delayed images were also obtained.



FINDINGS:

LIVER: Timely and homogenous uptake.



COMMON BILE DUCT: identified at 10 mins.



GALLBLADDER: identified at 15 mins.



SMALL BOWEL: Identified at 10 mins.



IMPRESSION:

No scintigraphic evidence of acute cholecystitis.  The cystic duct is 

patent.

## 2019-01-23 NOTE — PN
DATE:  01/22/2019



SUBJECTIVE:  The patient is seen today on 01/22/2019.  She is having some

abdominal pain.



PHYSICAL EXAMINATION:

VITAL SIGNS:  Blood pressure is systolic 121 lying down and 117 standing. 

The patient's temperature 98.1, respiratory rate 20, and pulse 66.

HEENT:  Pupils equal, reactive to light.  Normal-appearing mucosa of the

conjunctivae, oropharynx, and nasal membrane mucosa.

NECK:  Supple.  No JVD.  No carotid bruit.  No lymph node.  No thyromegaly.

CHEST AND LUNGS:  Bilateral symmetrical expansion.  Good air exchange.  No

rales.  No rhonchi.

CARDIOVASCULAR SYSTEM:  PMI not localized.  S1, S2.  No additional sounds.

ABDOMEN:  Normoactive bowel sounds.  The patient has epigastric tenderness.

No organomegaly.  No masses.

EXTREMITIES:  No cyanosis, no clubbing, and no edema.

CENTRAL NERVOUS SYSTEM:  Alert, awake, oriented x2.  No neurological

deficit could be appreciated.



ASSESSMENT:

1.  Syncope, likely situational syncope post-micturition.

2.  Abdominal pain, rule out gallbladder disease.

3.  Hypertension with orthostatic changes.



PLAN:  We will stop amlodipine.  Continue metoprolol.  We will order

abdominal ultrasound.





__________________________________________

Rosalind Villela MD





DD:  01/22/2019 22:28:26

DT:  01/22/2019 22:30:20

Job # [id number]

## 2019-01-24 VITALS
TEMPERATURE: 98.3 F | HEART RATE: 70 BPM | RESPIRATION RATE: 17 BRPM | DIASTOLIC BLOOD PRESSURE: 78 MMHG | OXYGEN SATURATION: 95 % | SYSTOLIC BLOOD PRESSURE: 122 MMHG

## 2019-01-24 RX ADMIN — ALBUTEROL SULFATE SCH PUFF: 90 AEROSOL, METERED RESPIRATORY (INHALATION) at 09:50

## 2019-01-24 RX ADMIN — AMMONIUM LACTATE SCH APPLIC: 12 CREAM TOPICAL at 09:48

## 2019-01-24 RX ADMIN — OLOPATADINE HYDROCHLORIDE SCH DROP: 1 SOLUTION/ DROPS OPHTHALMIC at 09:49

## 2019-01-24 RX ADMIN — METOPROLOL SUCCINATE SCH: 25 TABLET, EXTENDED RELEASE ORAL at 09:49

## 2019-01-24 NOTE — CP.PCM.PN
Subjective





- Date & Time of Evaluation


Date of Evaluation: 01/22/19


Time of Evaluation: 12:04





- Subjective


Subjective: 





Neuro Follow-Up Note:





Mrs. Jiménez was evaluated this morning at bedside.  She admits to feeling good 

today.  Was evaluated by cardiology this morning.  She currently denies any h/a,

dizziness, visual changes, chest pain, palpitations, sob, cough, n/v/d. 





Objective





- Vital Signs/Intake and Output


Vital Signs (last 24 hours): 


                                        











Temp Pulse Resp BP Pulse Ox


 


 98.1 F   62   18   106/59 L  98 


 


 01/22/19 08:04  01/22/19 09:00  01/22/19 08:04  01/22/19 08:28  01/22/19 08:04











- Medications


Medications: 


                               Current Medications





Albuterol (Ventolin Hfa 90 Mcg/Actuation (8 G))  1 puff IH DAILY [facility]


   Last Admin: 01/22/19 08:29 Dose:  1 puff


Alendronate Sodium (Fosamax)  70 mg PO QWK [facility]


Atorvastatin Calcium (Lipitor)  10 mg PO DAILY [facility]


   Last Admin: 01/22/19 08:25 Dose:  10 mg


Escitalopram Oxalate (Lexapro)  5 mg PO DAILY [facility]


   Last Admin: 01/22/19 08:24 Dose:  5 mg


Lactic Acid (Lac-Hydrin 12% Cream (140 G))  1 ea TOP DAILY [facility]


   Last Admin: 01/22/19 08:24 Dose:  1 applic


Loratadine (Claritin)  10 mg PO DAILY [facility]


   Last Admin: 01/22/19 08:23 Dose:  10 mg


Meclizine HCl (Antivert)  12.5 mg PO Q6H PRN


   PRN Reason: Dizziness


Metoprolol Succinate (Toprol Xl)  25 mg PO DAILY [facility]


   Last Admin: 01/22/19 08:28 Dose:  25 mg


Olopatadine HCl (Patanol 0.1% Opht Soln)  1 drop OU DAILY [facility]


   Last Admin: 01/22/19 08:25 Dose:  Not Given











- Labs


Labs: 


                                        





                                 01/20/19 18:00 





                                 01/20/19 18:00 





                                        











PT  12.0 Seconds (9.8-13.1)   01/20/19  18:00    


 


INR  1.1   01/20/19  18:00    


 


APTT  34.4 Seconds (25.6-37.1)   01/20/19  18:00    














- Constitutional


Appears: Well, Non-toxic, No Acute Distress





- Head Exam


Head Exam: ATRAUMATIC, NORMAL INSPECTION, NORMOCEPHALIC





- Eye Exam


Eye Exam: EOMI, Normal appearance, PERRL.  absent: Nystagmus


Pupil Exam: NORMAL ACCOMODATION, PERRL





- ENT Exam


ENT Exam: Mucous Membranes Moist, Normal Exam





- Neck Exam


Neck Exam: Full ROM, Normal Inspection





- Respiratory Exam


Respiratory Exam: NORMAL BREATHING PATTERN





- Extremities Exam


Extremities Exam: Full ROM, Normal Inspection.  absent: Calf Tenderness, Pedal 

Edema





- Back Exam


Back Exam: Full ROM, NORMAL INSPECTION





- Neurological Exam


Neurological Exam: Alert, Awake, CN II-XII Intact, Oriented x3, Reflexes Normal


Neuro motor strength exam: Left Upper Extremity: 5, Right Upper Extremity: 5, 

Left Lower Extremity: 5, Right Lower Extremity: 5


Additional comments: 





Speech clear,fluid


Strength equal


Sensation intact


No focal neuro deficits








- Psychiatric Exam


Psychiatric exam: Normal Affect, Normal Mood





- Skin


Skin Exam: Normal Color





Assessment and Plan


(1) Syncope and collapse


Assessment & Plan: 


Imaging reviewed:





-CTA Head and Neck (1/21/19): CT Angiography of the Brain is remarkable only for

minimal atherosclerosis of the cavernous internal artery segments bilaterally 

without stenosis. No large vessel occlusion or severe stenosis identified. 

Conjoint origin left common carotid and right brachiocephalic arteries off the 

aortic arch. No occlusion or significant stenosis appreciate in the cervical 

segments of the bilateral common or internal carotid arteries or bilateral 

vertebral arteries.


-CT Head (1/ /19): No intracranial mass, hemorrhage or evidence of acute 

infarct.  Chronic white matter ischemic change and mild atrophy





Mrs. Jiménez was found to be orthostatic this morning.  I discussed neuro plan 

and CTA findings with Dr. Villela during rounds.  Pt already rec'd IVF yesterday; 

as of today he is holding her Amlodipine and will re-evaluate her BP.  

Cardiology consult evaluated the pt as well and recommends outpatient f/u; we 

still recommend a LINQ device considering the frequency of her near-syncope and 

syncope if cardio is in agreement.





-ECHO ordered by cardio--will f/u with results once completed.


-Continue PT/OT


-Continue statin


-Notify neuro team of any acute changes in pt's condition.





Case discussed with Dr. James





Status: Acute
Subjective





- Date & Time of Evaluation


Date of Evaluation: 01/23/19


Time of Evaluation: 09:15





- Subjective


Subjective: 





NO CHEST PAIN OR SOB





NO FURTHER NEAR SYNCOPE OR SYNCOPE





Objective





- Vital Signs/Intake and Output


Vital Signs (last 24 hours): 


                                        











Temp Pulse Resp BP Pulse Ox


 


 98.2 F   68   20   102/66   96 


 


 01/23/19 08:12  01/23/19 09:01  01/23/19 08:12  01/23/19 09:01  01/23/19 08:12











- Medications


Medications: 


                               Current Medications





Acetaminophen (Tylenol 325mg Tab)  650 mg PO Q6 PRN


   PRN Reason: Pain, Mild (1-3)


   Last Admin: 01/22/19 22:12 Dose:  650 mg


Albuterol (Ventolin Hfa 90 Mcg/Actuation (8 G))  1 puff IH DAILY [facility]


   Last Admin: 01/23/19 08:52 Dose:  1 puff


Alendronate Sodium (Fosamax)  70 mg PO QWK [facility]


Atorvastatin Calcium (Lipitor)  10 mg PO DAILY [facility]


   Last Admin: 01/23/19 08:53 Dose:  Not Given


Escitalopram Oxalate (Lexapro)  5 mg PO DAILY [facility]


   Last Admin: 01/23/19 08:53 Dose:  Not Given


Lactic Acid (Lac-Hydrin 12% Cream (140 G))  1 ea TOP DAILY [facility]


   Last Admin: 01/23/19 08:59 Dose:  1 applic


Lactulose (Enulose)  20 gm PO Q6H PRN


   PRN Reason: Constipation


Loratadine (Claritin)  10 mg PO DAILY [facility]


   Last Admin: 01/23/19 08:50 Dose:  Not Given


Meclizine HCl (Antivert)  12.5 mg PO Q6H PRN


   PRN Reason: Dizziness


Metoprolol Succinate (Toprol Xl)  25 mg PO DAILY [facility]


   Last Admin: 01/23/19 09:01 Dose:  Not Given


Olopatadine HCl (Patanol 0.1% Opht Soln)  1 drop OU DAILY [facility]


   Last Admin: 01/23/19 08:52 Dose:  1 drop











- Labs


Labs: 


                                        





                                 01/22/19 13:20 





                                 01/22/19 13:20 





                                        











PT  12.0 Seconds (9.8-13.1)   01/20/19  18:00    


 


INR  1.1   01/20/19  18:00    


 


APTT  34.4 Seconds (25.6-37.1)   01/20/19  18:00    














- Respiratory Exam


Respiratory Exam: Clear to Ausculation Bilateral





- Cardiovascular Exam


Cardiovascular Exam: REGULAR RHYTHM, +S1, +S2





- Extremities Exam


Additional comments: 





NO LE EDEMA





- Additional Findings


Additional findings: 





EKG MONITOR NSR





ECHO GOOD LV SYSTOLIC FUNCTION





ABDOMINAL US CHOLELITHIASIS AND THICKENED GB WALLS





Assessment and Plan





- Assessment and Plan (Free Text)


Assessment: 





VERTIGO WITH SYNCOPE/NEAR SYNCOPE





HYPERTENSION





HYPERLIPIDEMIA


Plan: 





FOR HIDA SCAN
Subjective





- Date & Time of Evaluation


Date of Evaluation: 01/23/19


Time of Evaluation: 14:08





- Subjective


Subjective: 





Neuro Follow-Up Note:





Mrs. Jiménez was evaluated this afternoon at bedside.  She admits to feeling good

today; eager to go home.  She currently denies any h/a, dizziness, visual 

changes, chest pain, palpitations, sob, cough, n/v/d. 





Objective





- Vital Signs/Intake and Output


Vital Signs (last 24 hours): 


                                        











Temp Pulse Resp BP Pulse Ox


 


 97.8 F   64   20   114/73   98 


 


 01/23/19 12:06  01/23/19 12:06  01/23/19 12:06  01/23/19 12:06  01/23/19 12:06











- Medications


Medications: 


                               Current Medications





Acetaminophen (Tylenol 325mg Tab)  650 mg PO Q6 PRN


   PRN Reason: Pain, Mild (1-3)


   Last Admin: 01/22/19 22:12 Dose:  650 mg


Albuterol (Ventolin Hfa 90 Mcg/Actuation (8 G))  1 puff IH DAILY [facility]


   Last Admin: 01/23/19 08:52 Dose:  1 puff


Alendronate Sodium (Fosamax)  70 mg PO QWK [facility]


Atorvastatin Calcium (Lipitor)  10 mg PO DAILY [facility]


   Last Admin: 01/23/19 08:53 Dose:  Not Given


Escitalopram Oxalate (Lexapro)  5 mg PO DAILY [facility]


   Last Admin: 01/23/19 08:53 Dose:  Not Given


Lactic Acid (Lac-Hydrin 12% Cream (140 G))  1 ea TOP DAILY [facility]


   Last Admin: 01/23/19 08:59 Dose:  1 applic


Lactulose (Enulose)  20 gm PO Q6H PRN


   PRN Reason: Constipation


   Last Admin: 01/23/19 12:56 Dose:  20 gm


Loratadine (Claritin)  10 mg PO DAILY [facility]


   Last Admin: 01/23/19 08:50 Dose:  Not Given


Meclizine HCl (Antivert)  12.5 mg PO Q6H PRN


   PRN Reason: Dizziness


Metoprolol Succinate (Toprol Xl)  25 mg PO DAILY [facility]


   Last Admin: 01/23/19 09:01 Dose:  Not Given


Olopatadine HCl (Patanol 0.1% Opht Soln)  1 drop OU DAILY [facility]


   Last Admin: 01/23/19 08:52 Dose:  1 drop











- Labs


Labs: 


                                        





                                 01/22/19 13:20 





                                 01/22/19 13:20 





                                        











PT  12.0 Seconds (9.8-13.1)   01/20/19  18:00    


 


INR  1.1   01/20/19  18:00    


 


APTT  34.4 Seconds (25.6-37.1)   01/20/19  18:00    














- Constitutional


Appears: Well, Non-toxic, No Acute Distress





- Head Exam


Head Exam: ATRAUMATIC, NORMAL INSPECTION, NORMOCEPHALIC





- Eye Exam


Eye Exam: EOMI, Normal appearance, PERRL


Pupil Exam: NORMAL ACCOMODATION, PERRL





- ENT Exam


ENT Exam: Mucous Membranes Moist, Normal Exam





- Neck Exam


Neck Exam: Full ROM, Normal Inspection





- Respiratory Exam


Respiratory Exam: NORMAL BREATHING PATTERN





- GI/Abdominal Exam


GI & Abdominal Exam: Soft





- Extremities Exam


Extremities Exam: Full ROM.  absent: Calf Tenderness, Pedal Edema





- Back Exam


Back Exam: Full ROM, NORMAL INSPECTION





- Neurological Exam


Neurological Exam: Alert, Awake, CN II-XII Intact, Oriented x3, Reflexes Normal


Neuro motor strength exam: Left Upper Extremity: 5, Right Upper Extremity: 5, 

Left Lower Extremity: 5, Right Lower Extremity: 5


Additional comments: 





Speech clear,fluid


Strength equal


Sensation intact


No focal neuro deficits





- Psychiatric Exam


Psychiatric exam: Normal Affect, Normal Mood





- Skin


Skin Exam: Normal Color





Assessment and Plan


(1) Syncope and collapse


Assessment & Plan: 


Imaging reviewed:





-CTA Head and Neck (1/21/19): CT Angiography of the Brain is remarkable only for

minimal atherosclerosis of the cavernous internal artery segments bilaterally 

without stenosis. No large vessel occlusion or severe stenosis identified. 

Conjoint origin left common carotid and right brachiocephalic arteries off the 

aortic arch. No occlusion or significant stenosis appreciate in the cervical 

segments of the bilateral common or internal carotid arteries or bilateral 

vertebral arteries.


-CT Head (1/ /19): No intracranial mass, hemorrhage or evidence of acute 

infarct.  Chronic white matter ischemic change and mild atrophy


-ECHO: EF 55-60%; no LV thrombus.





-Follow up with cardiology as outpatient


-Since pt has had recurrent syncopal episodes we still recommend LINQ; may be 

done as outpatient with cardio.


-Continue PT/OT


-Continue statin while in hospital and upon d/c.


-Notify neuro team of any acute changes in pt's condition.





Case discussed with Dr. James


Status: Acute
Subjective





- Date & Time of Evaluation


Date of Evaluation: 01/24/19


Time of Evaluation: 08:45





- Subjective


Subjective: 





NO CHEST PAIN OR SOB





FEELS BETTER





Objective





- Vital Signs/Intake and Output


Vital Signs (last 24 hours): 


                                        











Temp Pulse Resp BP Pulse Ox


 


 97.9 F   65   20   99/67 L  94 L


 


 01/24/19 08:25  01/24/19 09:49  01/24/19 08:25  01/24/19 09:49  01/24/19 08:25











- Medications


Medications: 


                               Current Medications





Acetaminophen (Tylenol 325mg Tab)  650 mg PO Q6 PRN


   PRN Reason: Pain, Mild (1-3)


   Last Admin: 01/23/19 20:57 Dose:  650 mg


Albuterol (Ventolin Hfa 90 Mcg/Actuation (8 G))  1 puff IH DAILY [facility]


   Last Admin: 01/24/19 09:50 Dose:  1 puff


Alendronate Sodium (Fosamax)  70 mg PO QWK [facility]


Atorvastatin Calcium (Lipitor)  10 mg PO DAILY [facility]


   Last Admin: 01/24/19 09:48 Dose:  10 mg


Escitalopram Oxalate (Lexapro)  5 mg PO DAILY [facility]


   Last Admin: 01/24/19 09:48 Dose:  5 mg


Lactic Acid (Lac-Hydrin 12% Cream (140 G))  1 ea TOP DAILY [facility]


   Last Admin: 01/24/19 09:48 Dose:  1 applic


Lactulose (Enulose)  20 gm PO Q6H PRN


   PRN Reason: Constipation


   Last Admin: 01/24/19 09:48 Dose:  20 gm


Lidocaine HCl (Xylocaine 2%)  1 applic TOP DAILY [facility]


Loratadine (Claritin)  10 mg PO DAILY [facility]


   Last Admin: 01/24/19 09:47 Dose:  10 mg


Meclizine HCl (Antivert)  12.5 mg PO Q6H PRN


   PRN Reason: Dizziness


Metoprolol Succinate (Toprol Xl)  25 mg PO DAILY [facility]


   Last Admin: 01/24/19 09:49 Dose:  Not Given


Olopatadine HCl (Patanol 0.1% Opht Soln)  1 drop OU DAILY [facility]


   Last Admin: 01/24/19 09:49 Dose:  1 drop











- Labs


Labs: 


                                        





                                 01/22/19 13:20 





                                 01/22/19 13:20 





                                        











PT  12.0 Seconds (9.8-13.1)   01/20/19  18:00    


 


INR  1.1   01/20/19  18:00    


 


APTT  34.4 Seconds (25.6-37.1)   01/20/19  18:00    














- Respiratory Exam


Respiratory Exam: Clear to Ausculation Bilateral





- Cardiovascular Exam


Cardiovascular Exam: REGULAR RHYTHM, +S1, +S2





- Extremities Exam


Additional comments: 





NO LE EDEMA





- Additional Findings


Additional findings: 





EKG MONITOR SHOWS PATIENT HAS REMAINED IN NSR





HIDA SCAN IS NEGATIVE FOR ACUTE CHOLECYSTITIS





Assessment and Plan





- Assessment and Plan (Free Text)


Assessment: 





SYNCOPE/NEAR SYNCOPE WITH HISTORY OF VERTIGO AND SYNCOPE IN THE PAST





HYPERTENSION





HYPERLIPIDEMIA





STABLE CARDIAC STATUS-REMAINS IN SINUS RHYTHM


Plan: 





CONTINUE METOPROLOL, ATORVASTATIN AND MECLIZINE
Detail Level: Zone
Include Location In Plan?: Yes

## 2019-01-25 NOTE — DS
REASON FOR ADMISSION:  This is a 69-year-old [place] female with history

of multiple medical problems, who was admitted after a fall and head

trauma.



COURSE DURING HOSPITALIZATION:  The patient was admitted to telemetry

floor, and she had neurology consultation done by Dr. James.  The patient

was also found to have orthostatic blood pressure changes, and amlodipine

was stopped.  The patient had a cardiology consultation done by Dr. Brown.  Due to occasional abdominal pain, the patient had an abdominal

ultrasound that showed cholelithiasis suspicious for cholecystitis.  The

patient had HIDA scan that ruled out cholecystitis, and it showed patent

cystic duct.



FINAL DIAGNOSES:  Syncope, head trauma, hypertension, osteoarthritis, and

gastroesophageal reflux disease.





__________________________________________

[facility] MD Manohar





DD:  01/24/2019 22:38:42

DT:  01/24/2019 22:39:57

Job # [id number]